# Patient Record
Sex: FEMALE | Race: WHITE | NOT HISPANIC OR LATINO | Employment: FULL TIME | URBAN - METROPOLITAN AREA
[De-identification: names, ages, dates, MRNs, and addresses within clinical notes are randomized per-mention and may not be internally consistent; named-entity substitution may affect disease eponyms.]

---

## 2018-07-12 ENCOUNTER — APPOINTMENT (OUTPATIENT)
Dept: RADIOLOGY | Facility: CLINIC | Age: 38
End: 2018-07-12

## 2018-07-12 DIAGNOSIS — Z02.89 ENCOUNTER FOR OCCUPATIONAL PHYSICAL EXAMINATION: Primary | ICD-10-CM

## 2018-07-12 PROCEDURE — 80053 COMPREHEN METABOLIC PANEL: CPT | Performed by: FAMILY MEDICINE

## 2018-07-12 PROCEDURE — 71046 X-RAY EXAM CHEST 2 VIEWS: CPT

## 2018-07-12 PROCEDURE — 85025 COMPLETE CBC W/AUTO DIFF WBC: CPT | Performed by: FAMILY MEDICINE

## 2018-10-16 ENCOUNTER — OFFICE VISIT (OUTPATIENT)
Dept: FAMILY MEDICINE CLINIC | Facility: CLINIC | Age: 38
End: 2018-10-16
Payer: COMMERCIAL

## 2018-10-16 VITALS
OXYGEN SATURATION: 98 % | DIASTOLIC BLOOD PRESSURE: 72 MMHG | WEIGHT: 169.8 LBS | RESPIRATION RATE: 16 BRPM | SYSTOLIC BLOOD PRESSURE: 100 MMHG | BODY MASS INDEX: 28.29 KG/M2 | HEART RATE: 104 BPM | TEMPERATURE: 101 F | HEIGHT: 65 IN

## 2018-10-16 DIAGNOSIS — J01.00 ACUTE NON-RECURRENT MAXILLARY SINUSITIS: Primary | ICD-10-CM

## 2018-10-16 PROCEDURE — 1036F TOBACCO NON-USER: CPT | Performed by: FAMILY MEDICINE

## 2018-10-16 PROCEDURE — 99203 OFFICE O/P NEW LOW 30 MIN: CPT | Performed by: FAMILY MEDICINE

## 2018-10-16 PROCEDURE — 3008F BODY MASS INDEX DOCD: CPT | Performed by: FAMILY MEDICINE

## 2018-10-16 RX ORDER — MULTIVITAMIN
1 TABLET ORAL DAILY
COMMUNITY
End: 2019-02-13 | Stop reason: ALTCHOICE

## 2018-10-16 RX ORDER — NORETHINDRONE 0.35 MG
KIT ORAL
COMMUNITY
Start: 2018-07-11 | End: 2019-02-13 | Stop reason: ALTCHOICE

## 2018-10-16 NOTE — PROGRESS NOTES
Subjective:           Problem List Items Addressed This Visit     None      Visit Diagnoses     Acute non-recurrent maxillary sinusitis    -  Primary              No orders of the defined types were placed in this encounter  Patient Instructions   Afrin spray 1 spray daily x 3 days  Tylenol temp >101  antibiotic as discussed      Tom Schmitt      HPI fever congestion  2-3 days  Recent marriage  Others ill at home  sinus pressure yellow green d/c cough  Some come and going R abd discomfort  BM normal no dysuria    Vitals:    10/16/18 1608   BP: 100/72   Pulse: 104   Resp: 16   Temp: (!) 101 °F (38 3 °C)   SpO2: 98%       No Known Allergies    No current outpatient prescriptions on file prior to visit  No current facility-administered medications on file prior to visit  No past medical history on file  Past Surgical History:   Procedure Laterality Date    REDUCTION MAMMAPLASTY      RHINOPLASTY            reports that she has never smoked  She has never used smokeless tobacco      reports that she has never smoked  She has never used smokeless tobacco     The following portions of the patient's history were reviewed and updated as appropriate: allergies, current medications, past family history, past medical history, past social history, past surgical history and problem list     Review of Systems   Constitutional: Positive for fever  HENT: Positive for congestion, postnasal drip, sinus pain, sinus pressure and sore throat  Negative for nosebleeds and trouble swallowing  Respiratory: Positive for cough  Gastrointestinal: Negative for abdominal distention  Genitourinary: Negative for dysuria  Musculoskeletal: Negative for back pain  Hematological: Negative for adenopathy  Psychiatric/Behavioral: Negative for agitation  Physical Exam   Constitutional: She appears well-developed and well-nourished  No distress  HENT:   Head: Normocephalic     Right Ear: External ear normal    Left Ear: External ear normal    Turbinates swelling R>L yellow mucoid   Eyes: Pupils are equal, round, and reactive to light  Conjunctivae are normal  Right eye exhibits no discharge  No scleral icterus  Neck: Normal range of motion  No JVD present  Cardiovascular: Normal rate, regular rhythm and normal heart sounds  Exam reveals no gallop  No murmur heard  Pulmonary/Chest: Effort normal and breath sounds normal  She has no wheezes  She exhibits no tenderness  Scattered rhonchi clears with cough   Abdominal: Soft  Bowel sounds are normal  She exhibits no distension and no mass  There is no tenderness  There is no rebound and no guarding  Musculoskeletal: Normal range of motion  Lymphadenopathy:     She has no cervical adenopathy  Neurological: No cranial nerve deficit  Skin: Skin is warm  Capillary refill takes less than 2 seconds  No rash noted  Psychiatric: She has a normal mood and affect

## 2018-10-22 ENCOUNTER — TELEPHONE (OUTPATIENT)
Dept: FAMILY MEDICINE CLINIC | Facility: CLINIC | Age: 38
End: 2018-10-22

## 2018-10-22 NOTE — TELEPHONE ENCOUNTER
Pt called states  Having cough and congestion    Would like to know what you recommend  She takes OTC af/rma

## 2019-02-13 ENCOUNTER — OFFICE VISIT (OUTPATIENT)
Dept: FAMILY MEDICINE CLINIC | Facility: CLINIC | Age: 39
End: 2019-02-13
Payer: COMMERCIAL

## 2019-02-13 VITALS
HEIGHT: 65 IN | DIASTOLIC BLOOD PRESSURE: 70 MMHG | OXYGEN SATURATION: 98 % | SYSTOLIC BLOOD PRESSURE: 94 MMHG | RESPIRATION RATE: 18 BRPM | TEMPERATURE: 98.8 F | BODY MASS INDEX: 31.32 KG/M2 | WEIGHT: 188 LBS | HEART RATE: 110 BPM

## 2019-02-13 DIAGNOSIS — J11.1 INFLUENZA: Primary | ICD-10-CM

## 2019-02-13 PROCEDURE — 99213 OFFICE O/P EST LOW 20 MIN: CPT | Performed by: NURSE PRACTITIONER

## 2019-02-13 PROCEDURE — 3008F BODY MASS INDEX DOCD: CPT | Performed by: NURSE PRACTITIONER

## 2019-02-13 RX ORDER — LEVOTHYROXINE SODIUM 0.03 MG/1
TABLET ORAL
COMMUNITY
Start: 2019-01-30 | End: 2020-12-22

## 2019-02-13 NOTE — PROGRESS NOTES
Assessment/Plan:    Problem List Items Addressed This Visit        Respiratory    Influenza - Primary     Recommend supportive care with fluids, rest, saline nasal lavage 1-2 sprays each nostril and Claritin daily Instructed pt RTO if symptoms persist or worsen over the course of the next week             Patient Instructions   Increase fluid intake as tolerated  - water, Gatorade, and pedialyte  Rest and humidification   Continue medications as directed   - Nasal saline spray OTC 1-2 sprays each nostril daily PRN post nasal drip   - Tylenol for fevers as directed  Return to office in one week if symptoms persist or worsen        Return in about 1 week (around 2/20/2019), or if symptoms worsen or fail to improve  Subjective:      Patient ID: Reid Cox is a 41 y o  female  Chief Complaint   Patient presents with    Vomiting     only once on monday 2/11/19    Generalized Body Aches      patietn states sx have been otis on for the past 3 days  af/rma     Cough    Cold Like Symptoms    Sore Throat    Chills       Viji Correa is a 41year old female who presents to the office for evaluation of sore throat, chills, runny nose, joint pain and body aches x's 3 days ago  Pt also reports she had one episode of vomiting on the evening of the onset of her symptoms  Denies chest pain, SOB, wheezing or diarrhea at this time  Repots intermittent fevers with Tmax 101 9 F and chills  Pt was swabbed for strep at another healthcare facility 2 days ago and results were negative for strep, culture results negative  Pt is 10 weeks pregnant  The following portions of the patient's history were reviewed and updated as appropriate: allergies, current medications, past family history, past medical history, past social history, past surgical history and problem list     Review of Systems   Constitutional: Positive for chills, fatigue and fever  Negative for diaphoresis     HENT: Positive for congestion, rhinorrhea and sore throat  Negative for ear discharge, ear pain, postnasal drip, sinus pressure and sinus pain  Eyes: Negative for pain and discharge  Respiratory: Positive for cough  Negative for chest tightness, shortness of breath and wheezing  Cardiovascular: Negative for chest pain  Gastrointestinal: Positive for vomiting  Negative for diarrhea and nausea  Genitourinary: Negative for dysuria  Musculoskeletal: Positive for arthralgias and myalgias  Skin: Negative for rash  Neurological: Negative for dizziness and headaches  Hematological: Negative for adenopathy  Current Outpatient Medications   Medication Sig Dispense Refill    BIOTIN PO Take by mouth      Docosahexaenoic Acid (PRENATAL DHA PO) Take 1 tablet by mouth daily      levothyroxine 25 mcg tablet        No current facility-administered medications for this visit  Objective:    BP 94/70 (BP Location: Right arm, Patient Position: Sitting, Cuff Size: Large)   Pulse (!) 110   Temp 98 8 °F (37 1 °C) (Tympanic)   Resp 18   Ht 5' 5" (1 651 m)   Wt 85 3 kg (188 lb)   LMP 11/13/2018 (Approximate)   SpO2 98%   BMI 31 28 kg/m²        Physical Exam   Constitutional: She is oriented to person, place, and time  She appears well-developed and well-nourished  She appears ill  No distress  HENT:   Head: Normocephalic and atraumatic  Right Ear: External ear and ear canal normal  No drainage, swelling or tenderness  No middle ear effusion  Left Ear: External ear and ear canal normal  No drainage, swelling or tenderness  No middle ear effusion  Nose: Mucosal edema and rhinorrhea present  No sinus tenderness  Right sinus exhibits no maxillary sinus tenderness and no frontal sinus tenderness  Left sinus exhibits no maxillary sinus tenderness and no frontal sinus tenderness  Mouth/Throat: Uvula is midline and mucous membranes are normal  Posterior oropharyngeal erythema present  No oropharyngeal exudate or posterior oropharyngeal edema  No tonsillar exudate  Eyes: Conjunctivae are normal  Right eye exhibits no discharge  Left eye exhibits no discharge  Neck: Normal range of motion  Neck supple  No thyromegaly present  Cardiovascular: Normal rate, regular rhythm and normal heart sounds  Pulmonary/Chest: Effort normal and breath sounds normal  No respiratory distress  She has no decreased breath sounds  She has no wheezes  She has no rhonchi  She has no rales  Abdominal: Soft  Bowel sounds are normal  She exhibits no distension  There is no tenderness  Lymphadenopathy:     She has no cervical adenopathy  Neurological: She is alert and oriented to person, place, and time  Skin: Skin is warm and dry  No rash noted  She is not diaphoretic  Psychiatric: She has a normal mood and affect   Her behavior is normal  Thought content normal          GERI Guaman

## 2019-02-13 NOTE — PATIENT INSTRUCTIONS
Increase fluid intake as tolerated  - water, Gatorade, and pedialyte  Rest and humidification   Continue medications as directed   - Nasal saline spray OTC 1-2 sprays each nostril daily PRN post nasal drip   - Tylenol for fevers as directed  Return to office in one week if symptoms persist or worsen

## 2019-02-13 NOTE — ASSESSMENT & PLAN NOTE
Recommend supportive care with fluids, rest, saline nasal lavage 1-2 sprays each nostril and Claritin daily Instructed pt RTO if symptoms persist or worsen over the course of the next week

## 2019-02-15 ENCOUNTER — TELEPHONE (OUTPATIENT)
Dept: FAMILY MEDICINE CLINIC | Facility: CLINIC | Age: 39
End: 2019-02-15

## 2019-02-15 NOTE — TELEPHONE ENCOUNTER
patient called states  Is still coughing and  Is unable to sleep because of the cough would like to know if she could take any other medication besides the robitussin  Please advise?  af/jessica

## 2019-02-15 NOTE — TELEPHONE ENCOUNTER
spoke to pt aware of previous mentioned  States she is having on and off again fever when she takes tylenol and the fever comes down and goes back up  af/rma

## 2019-02-15 NOTE — TELEPHONE ENCOUNTER
She should continue to take tylenol and come into the office or go to ER for any fever lasting longer than 2 hours without relief from tylenol   Bela Arredondo

## 2019-02-15 NOTE — TELEPHONE ENCOUNTER
Pt may take mucinex (PLAIN mucinex OTC) which is safe for pregnancy   Trg [address] Mend[address] Garrison

## 2019-02-25 ENCOUNTER — TELEPHONE (OUTPATIENT)
Dept: FAMILY MEDICINE CLINIC | Facility: CLINIC | Age: 39
End: 2019-02-25

## 2019-02-25 NOTE — TELEPHONE ENCOUNTER
patient called states has cough and headaches  Has been taking tylenol but is not helping can she take anything else since she is pregnant? af/rma

## 2019-02-25 NOTE — TELEPHONE ENCOUNTER
Patient called back informed of previous mentioned, states the headaches are not going away informed patient as per Dr Jazmín Trevino patient can try Asprin free of Excedrin, minimal Advil and to call obgyn to confirm safe dosage  Of medication gemma erazo as she is 15 weeks pregnant   af/rma

## 2019-03-18 ENCOUNTER — OFFICE VISIT (OUTPATIENT)
Dept: FAMILY MEDICINE CLINIC | Facility: CLINIC | Age: 39
End: 2019-03-18
Payer: COMMERCIAL

## 2019-03-18 VITALS
TEMPERATURE: 98.9 F | HEART RATE: 104 BPM | HEIGHT: 65 IN | WEIGHT: 189.8 LBS | RESPIRATION RATE: 18 BRPM | BODY MASS INDEX: 31.62 KG/M2 | SYSTOLIC BLOOD PRESSURE: 112 MMHG | DIASTOLIC BLOOD PRESSURE: 70 MMHG

## 2019-03-18 DIAGNOSIS — E03.9 ACQUIRED HYPOTHYROIDISM: ICD-10-CM

## 2019-03-18 DIAGNOSIS — R05.9 COUGH: Primary | ICD-10-CM

## 2019-03-18 PROCEDURE — 99213 OFFICE O/P EST LOW 20 MIN: CPT | Performed by: FAMILY MEDICINE

## 2019-03-18 PROCEDURE — 1036F TOBACCO NON-USER: CPT | Performed by: FAMILY MEDICINE

## 2019-03-18 PROCEDURE — 3008F BODY MASS INDEX DOCD: CPT | Performed by: FAMILY MEDICINE

## 2019-03-18 RX ORDER — CHOLECALCIFEROL (VITAMIN D3) 125 MCG
TABLET ORAL
Status: ON HOLD | COMMUNITY
End: 2021-01-06

## 2019-03-18 RX ORDER — PROMETHAZINE HYDROCHLORIDE AND CODEINE PHOSPHATE 6.25; 1 MG/5ML; MG/5ML
5 SYRUP ORAL 3 TIMES DAILY PRN
Qty: 120 ML | Refills: 0 | Status: ON HOLD | OUTPATIENT
Start: 2019-03-18 | End: 2021-01-06

## 2019-03-18 NOTE — PROGRESS NOTES
Subjective:           Problem List Items Addressed This Visit        Endocrine    Acquired hypothyroidism labs review ? endocrinology Free t4       Other    Cough - Primary use sp[aringly f/u OB  Relevant Medications    promethazine-codeine (PHENERGAN WITH CODEINE) 6 25-10 mg/5 mL syrup              No orders of the defined types were placed in this encounter  Patient Instructions   Hydrate well  Claritin 10mg 1 daily   F/u OB   Robitussin DM 10cc fabio 8 hours       f/u if fever yellow sputum or nasal discharge fever  [place]    Chief Complaint   Patient presents with    Cough     Pt is 4mo pregnant  jmcma    Vomitting    Headache    Sinus Congestion    Sore Throat     HPI Jagjit Kahn is in for evaluation ongoing cough prompting vomiting  She has no abdominal pain or fever no urinary symptoms cough is dry persistent  She has a history of hypothyroidism needs labs    /70   Pulse 104   Temp 98 9 °F (37 2 °C)   Resp 18   Ht 5' 5" (1 651 m)   Wt 86 1 kg (189 lb 12 8 oz)   LMP 11/13/2018 (Approximate)   BMI 31 58 kg/m²       No Known Allergies    Current Outpatient Medications on File Prior to Visit   Medication Sig Dispense Refill    Docosahexaenoic Acid (PRENATAL DHA PO) Take 1 tablet by mouth daily      Ergocalciferol (VITAMIN D2) 2000 units TABS Take by mouth      levothyroxine 25 mcg tablet       BIOTIN PO Take by mouth       No current facility-administered medications on file prior to visit  History reviewed  No pertinent past medical history  Past Surgical History:   Procedure Laterality Date    REDUCTION MAMMAPLASTY      RHINOPLASTY            reports that she has never smoked  She has never used smokeless tobacco      reports that she has never smoked  She has never used smokeless tobacco         Review of Systems   Constitutional: Negative for fever  HENT: Positive for congestion, postnasal drip, sinus pressure and sinus pain   Negative for nosebleeds, sore throat and trouble swallowing  Respiratory: Positive for cough  Gastrointestinal: Positive for vomiting  Negative for abdominal distention  Cough induced   Genitourinary: Negative for dysuria, hematuria, pelvic pain, urgency, vaginal bleeding and vaginal discharge  Pregnant 2nd trim   Musculoskeletal: Negative for back pain and neck stiffness  Skin: Negative for color change  Hematological: Negative for adenopathy  Psychiatric/Behavioral: Negative for agitation  Physical Exam   Constitutional: She is oriented to person, place, and time  She appears well-developed and well-nourished  No distress  HENT:   Head: Normocephalic  Right Ear: External ear normal    Left Ear: External ear normal    Turbinate mucoid   Eyes: Pupils are equal, round, and reactive to light  Conjunctivae are normal  Right eye exhibits no discharge  No scleral icterus  Neck: Normal range of motion  No JVD present  Cardiovascular: Normal rate, regular rhythm and normal heart sounds  Exam reveals no gallop  No murmur heard  Pulmonary/Chest: Effort normal and breath sounds normal  She has no wheezes  She exhibits no tenderness  Scattered rhonchi clears with cough   Abdominal: Soft  Bowel sounds are normal  She exhibits no distension and no mass  There is no tenderness  There is no rebound and no guarding  Musculoskeletal: Normal range of motion  Lymphadenopathy:     She has no cervical adenopathy  Neurological: She is alert and oriented to person, place, and time  No cranial nerve deficit  Skin: Skin is warm  Capillary refill takes less than 2 seconds  No rash noted  To umb   Psychiatric: She has a normal mood and affect

## 2019-03-18 NOTE — PATIENT INSTRUCTIONS
Hydrate well  Claritin 10mg 1 daily   F/u OB   Robitussin DM 10cc fabio 8 hours       f/u if fever yellow sputum or nasal discharge fever

## 2019-09-23 ENCOUNTER — TELEPHONE (OUTPATIENT)
Dept: FAMILY MEDICINE CLINIC | Facility: CLINIC | Age: 39
End: 2019-09-23

## 2019-09-23 NOTE — TELEPHONE ENCOUNTER
Patient calling today, she is 4 weeks postpartum  She states that she has not gone to the bathroom properly in a week  Stool is very hard and patient has to strain a lot with little to know relief and if having some bright red blood when she wipes  Has been taking an otc oral stool softener  When asked if she has contacted her Obgyn she states that she has used a midwife and she thinks this is beyond their scope of practice because she has looked to them for assistance, but they have not been much help  Patient advised per verbal from Dr Jose Campos to use warm prune juice 4, drink 6-8 glasses of water per day, glycerin suppositories, and miralax 1/2 cap full daily x 5 days  If no bowel movement in 24-48 hours patient should go to ER and consult with and Obgyn  Patient understands  No further action needed at this time

## 2020-02-05 ENCOUNTER — TELEPHONE (OUTPATIENT)
Dept: FAMILY MEDICINE CLINIC | Facility: CLINIC | Age: 40
End: 2020-02-05

## 2020-02-05 NOTE — TELEPHONE ENCOUNTER
Patient is breast feeding her 11 month old baby  She has a non productive cough  No other sxs, no fever, no congestion  Would like to know what she can take? Please advise    Judy Arredondo MA

## 2020-02-05 NOTE — TELEPHONE ENCOUNTER
claritin ok for nasal congestion   Mucinex or Robitussin DM ok for cough    decaf tea ok  ,cough drops

## 2020-06-23 ENCOUNTER — APPOINTMENT (OUTPATIENT)
Dept: RADIOLOGY | Facility: CLINIC | Age: 40
End: 2020-06-23
Payer: COMMERCIAL

## 2020-06-23 ENCOUNTER — OFFICE VISIT (OUTPATIENT)
Dept: OBGYN CLINIC | Facility: CLINIC | Age: 40
End: 2020-06-23
Payer: COMMERCIAL

## 2020-06-23 VITALS
SYSTOLIC BLOOD PRESSURE: 95 MMHG | HEIGHT: 65 IN | BODY MASS INDEX: 35.82 KG/M2 | TEMPERATURE: 97.7 F | WEIGHT: 215 LBS | HEART RATE: 75 BPM | DIASTOLIC BLOOD PRESSURE: 68 MMHG

## 2020-06-23 DIAGNOSIS — M25.531 PAIN IN RIGHT WRIST: ICD-10-CM

## 2020-06-23 DIAGNOSIS — M65.4 DE QUERVAIN'S TENOSYNOVITIS, RIGHT: Primary | ICD-10-CM

## 2020-06-23 PROCEDURE — 3008F BODY MASS INDEX DOCD: CPT | Performed by: ORTHOPAEDIC SURGERY

## 2020-06-23 PROCEDURE — 99203 OFFICE O/P NEW LOW 30 MIN: CPT | Performed by: ORTHOPAEDIC SURGERY

## 2020-06-23 PROCEDURE — 73110 X-RAY EXAM OF WRIST: CPT

## 2020-06-23 PROCEDURE — 1036F TOBACCO NON-USER: CPT | Performed by: ORTHOPAEDIC SURGERY

## 2020-06-23 RX ORDER — MULTIVIT WITH MINERALS/LUTEIN
1000 TABLET ORAL DAILY
Status: ON HOLD | COMMUNITY
End: 2021-01-06

## 2020-06-23 RX ORDER — PHENOL 1.4 %
600 AEROSOL, SPRAY (ML) MUCOUS MEMBRANE 2 TIMES DAILY WITH MEALS
Status: ON HOLD | COMMUNITY
End: 2021-01-06

## 2020-08-31 ENCOUNTER — OFFICE VISIT (OUTPATIENT)
Dept: OBGYN CLINIC | Facility: CLINIC | Age: 40
End: 2020-08-31
Payer: COMMERCIAL

## 2020-08-31 VITALS
SYSTOLIC BLOOD PRESSURE: 102 MMHG | HEIGHT: 65 IN | DIASTOLIC BLOOD PRESSURE: 72 MMHG | BODY MASS INDEX: 35.85 KG/M2 | TEMPERATURE: 98.3 F | HEART RATE: 86 BPM | WEIGHT: 215.2 LBS

## 2020-08-31 DIAGNOSIS — M65.4 DE QUERVAIN'S TENOSYNOVITIS, RIGHT: Primary | ICD-10-CM

## 2020-08-31 PROCEDURE — 1036F TOBACCO NON-USER: CPT | Performed by: ORTHOPAEDIC SURGERY

## 2020-08-31 PROCEDURE — 3008F BODY MASS INDEX DOCD: CPT | Performed by: ORTHOPAEDIC SURGERY

## 2020-08-31 PROCEDURE — 99213 OFFICE O/P EST LOW 20 MIN: CPT | Performed by: ORTHOPAEDIC SURGERY

## 2020-08-31 NOTE — PROGRESS NOTES
Assessment/Plan:  1  De Quervain's tenosynovitis, right         Scribe Attestation    I,:   [employer], MA am acting as a scribe while in the presence of the attending physician :        I,:   Rachel Fletcher, DO personally performed the services described in this documentation    as scribed in my presence :              BODØ has continued pain  Patient is still currently breat feeding  I explained to her I do not do steroid injections in patients who are breast feeding  We did again discuss formal therapy however, she states she does not have time to attend therapy  She may take Tylenol OTC as needed for pain  We did briefly discuss surgical intervention in the form of de quervain's release  She may follow up with me as needed  Subjective:   Kadeem Good is a 47 y o  female who presents to the office today for follow up evaluation right wrist de quervain's  Patient states she is doing well  She states her pain has improved somewhat  She has been using the wrist brace as needed  She states the wrist brace at times can increase her pain  Patient states she is still currently breast feeding  Review of Systems   Constitutional: Negative for chills and fever  HENT: Negative for drooling and sneezing  Eyes: Negative for redness  Respiratory: Negative for cough and wheezing  Gastrointestinal: Negative for nausea and vomiting  Musculoskeletal: Negative for arthralgias, joint swelling and myalgias  Neurological: Negative for weakness and numbness  Psychiatric/Behavioral: Negative for behavioral problems  The patient is not nervous/anxious  History reviewed  No pertinent past medical history      Past Surgical History:   Procedure Laterality Date    REDUCTION MAMMAPLASTY      RHINOPLASTY         Family History   Problem Relation Age of Onset    Stroke Maternal Grandmother        Social History     Occupational History    Not on file   Tobacco Use    Smoking status: Never Smoker  Smokeless tobacco: Never Used   Substance and Sexual Activity    Alcohol use: Yes    Drug use: Never    Sexual activity: Not on file         Current Outpatient Medications:     Ascorbic Acid (VITAMIN C) 1000 MG tablet, Take 1,000 mg by mouth daily, Disp: , Rfl:     BIOTIN PO, Take by mouth, Disp: , Rfl:     calcium carbonate (OS-SEAN) 600 MG tablet, Take 600 mg by mouth 2 (two) times a day with meals, Disp: , Rfl:     Docosahexaenoic Acid (PRENATAL DHA PO), Take 1 tablet by mouth daily, Disp: , Rfl:     Ergocalciferol (VITAMIN D2) 2000 units TABS, Take by mouth, Disp: , Rfl:     levothyroxine 25 mcg tablet, , Disp: , Rfl:     promethazine-codeine (PHENERGAN WITH CODEINE) 6 25-10 mg/5 mL syrup, Take 5 mL by mouth 3 (three) times a day as needed for cough (nausea) (Patient not taking: Reported on 6/23/2020), Disp: 120 mL, Rfl: 0    No Known Allergies    Objective:  Vitals:    08/31/20 1720   BP: 102/72   Pulse: 86   Temp: 98 3 °F (36 8 °C)       Ortho Exam     Right wrist    TTP 1st dorsal compartment   Mild crepitus over 1st dorsal compartment   Compartments soft  Brisk capillary refill  S/m intact median, radial, and ulnar nerve     Physical Exam  Constitutional:       Appearance: She is well-developed  HENT:      Head: Normocephalic and atraumatic  Eyes:      General:         Right eye: No discharge  Left eye: No discharge  Conjunctiva/sclera: Conjunctivae normal    Neck:      Musculoskeletal: Normal range of motion and neck supple  Cardiovascular:      Rate and Rhythm: Normal rate  Pulmonary:      Effort: Pulmonary effort is normal  No respiratory distress  Musculoskeletal:      Comments: As noted in HPI   Skin:     General: Skin is warm and dry  Neurological:      Mental Status: She is alert and oriented to person, place, and time  Psychiatric:         Behavior: Behavior normal          Thought Content:  Thought content normal          Judgment: Judgment normal

## 2020-09-25 ENCOUNTER — OFFICE VISIT (OUTPATIENT)
Dept: OBGYN CLINIC | Facility: CLINIC | Age: 40
End: 2020-09-25
Payer: COMMERCIAL

## 2020-09-25 VITALS
HEIGHT: 65 IN | SYSTOLIC BLOOD PRESSURE: 105 MMHG | TEMPERATURE: 99.2 F | HEART RATE: 88 BPM | DIASTOLIC BLOOD PRESSURE: 74 MMHG | WEIGHT: 215 LBS | BODY MASS INDEX: 35.82 KG/M2

## 2020-09-25 DIAGNOSIS — M65.4 DE QUERVAIN'S TENOSYNOVITIS, RIGHT: Primary | ICD-10-CM

## 2020-09-25 PROCEDURE — 99213 OFFICE O/P EST LOW 20 MIN: CPT | Performed by: ORTHOPAEDIC SURGERY

## 2020-09-25 PROCEDURE — 1036F TOBACCO NON-USER: CPT | Performed by: ORTHOPAEDIC SURGERY

## 2020-09-25 PROCEDURE — 20550 NJX 1 TENDON SHEATH/LIGAMENT: CPT | Performed by: ORTHOPAEDIC SURGERY

## 2020-09-25 NOTE — PROGRESS NOTES
Assessment/Plan:  1  De Quervain's tenosynovitis, right       Steroid injection was provided today for right de Quervain  Patient tolerated this well  She will follow up in 2-3 months for re-evaluation  She may be having another child soon in which case she would be breast-feeding again  She is considering having surgery  We reviewed surgical versus nonsurgical options  Postop instructions were also provided  All questions were answered  Subjective: R wrist pain    Patient ID: Se Gonzalez is a 47 y o  female  HPI  Right wrist pain follow-up     Patient presents today for follow-up for right wrist   She is no longer breast-feeding  She is having pain over the same area she presented prior  She declined an injection at that time as she was breast-feeding  She has no numbness or tingling about the hand  She does have pain over the radial aspect of the wrist and is worse with opening a jar lifting her small child  Review of Systems   Constitutional: Positive for activity change  Negative for chills, fever and unexpected weight change  HENT: Negative for hearing loss, nosebleeds and sore throat  Eyes: Negative for pain, redness and visual disturbance  Respiratory: Negative for cough, shortness of breath and wheezing  Cardiovascular: Negative for chest pain, palpitations and leg swelling  Gastrointestinal: Negative for abdominal pain, nausea and vomiting  Endocrine: Negative for polydipsia and polyuria  Genitourinary: Negative for dysuria and hematuria  Musculoskeletal:        See HPI   Skin: Negative for rash and wound  Neurological: Negative for dizziness, numbness and headaches  Psychiatric/Behavioral: Negative for decreased concentration and suicidal ideas  The patient is not nervous/anxious  History reviewed  No pertinent past medical history      Past Surgical History:   Procedure Laterality Date    REDUCTION MAMMAPLASTY      RHINOPLASTY         Family History   Problem Relation Age of Onset    Stroke Maternal Grandmother        Social History     Occupational History    Not on file   Tobacco Use    Smoking status: Never Smoker    Smokeless tobacco: Never Used   Substance and Sexual Activity    Alcohol use: Yes    Drug use: Never    Sexual activity: Not on file         Current Outpatient Medications:     Ascorbic Acid (VITAMIN C) 1000 MG tablet, Take 1,000 mg by mouth daily, Disp: , Rfl:     BIOTIN PO, Take by mouth, Disp: , Rfl:     calcium carbonate (OS-SEAN) 600 MG tablet, Take 600 mg by mouth 2 (two) times a day with meals, Disp: , Rfl:     Docosahexaenoic Acid (PRENATAL DHA PO), Take 1 tablet by mouth daily, Disp: , Rfl:     Ergocalciferol (VITAMIN D2) 2000 units TABS, Take by mouth, Disp: , Rfl:     levothyroxine 25 mcg tablet, , Disp: , Rfl:     promethazine-codeine (PHENERGAN WITH CODEINE) 6 25-10 mg/5 mL syrup, Take 5 mL by mouth 3 (three) times a day as needed for cough (nausea) (Patient not taking: Reported on 6/23/2020), Disp: 120 mL, Rfl: 0    No Known Allergies    Objective:  Vitals:    09/25/20 0836   BP: 105/74   Pulse: 88   Temp: 99 2 °F (37 3 °C)       Body mass index is 35 78 kg/m²  Ortho Exam       Right wrist  Positive tender over the 1st dorsal compartment  Positive hitchhiker sign  +/- Finkelstein sign  Full range of motion  Compartment soft  Negative Tinel's  Negative Durkan's    Physical Exam  Vitals signs and nursing note reviewed  Constitutional:       Appearance: She is well-developed  HENT:      Head: Normocephalic and atraumatic  Eyes:      Conjunctiva/sclera: Conjunctivae normal       Pupils: Pupils are equal, round, and reactive to light  Neck:      Musculoskeletal: Normal range of motion and neck supple  Cardiovascular:      Rate and Rhythm: Normal rate  Pulmonary:      Effort: Pulmonary effort is normal  No respiratory distress     Musculoskeletal:      Comments: As noted in HPI   Skin:     General: Skin is warm and dry  Neurological:      Mental Status: She is alert and oriented to person, place, and time  Psychiatric:         Behavior: Behavior normal          I have personally reviewed pertinent films in PACS          Hand/upper extremity injection  Date/Time: 9/25/2020 9:49 AM  Consent given by: patient  Supporting Documentation  Indications: tendon swelling   Procedure Details  Condition:de Quervain's tenosynovitis Needle size: 27 G  Ultrasound guidance: no  Approach: radial    Patient tolerance: patient tolerated the procedure well with no immediate complications  Dressing:  Sterile dressing applied

## 2020-12-07 ENCOUNTER — OFFICE VISIT (OUTPATIENT)
Dept: OBGYN CLINIC | Facility: CLINIC | Age: 40
End: 2020-12-07
Payer: COMMERCIAL

## 2020-12-07 VITALS
HEART RATE: 83 BPM | HEIGHT: 65 IN | DIASTOLIC BLOOD PRESSURE: 77 MMHG | SYSTOLIC BLOOD PRESSURE: 115 MMHG | WEIGHT: 215 LBS | BODY MASS INDEX: 35.82 KG/M2

## 2020-12-07 DIAGNOSIS — M65.4 DE QUERVAIN'S TENOSYNOVITIS, RIGHT: Primary | ICD-10-CM

## 2020-12-07 PROCEDURE — 99213 OFFICE O/P EST LOW 20 MIN: CPT | Performed by: ORTHOPAEDIC SURGERY

## 2020-12-07 PROCEDURE — 3008F BODY MASS INDEX DOCD: CPT | Performed by: ORTHOPAEDIC SURGERY

## 2020-12-07 PROCEDURE — 1036F TOBACCO NON-USER: CPT | Performed by: ORTHOPAEDIC SURGERY

## 2020-12-07 RX ORDER — CEFAZOLIN SODIUM 2 G/50ML
2000 SOLUTION INTRAVENOUS ONCE
Status: CANCELLED | OUTPATIENT
Start: 2021-01-06 | End: 2020-12-07

## 2020-12-22 NOTE — PRE-PROCEDURE INSTRUCTIONS
My Surgical Experience    The following information was developed to assist you to prepare for your operation  What do I need to do before coming to the hospital?   Arrange for a responsible person to drive you to and from the hospital    Arrange care for your children at home  Children are not allowed in the recovery areas of the hospital   Plan to wear clothing that is easy to put on and take off  If you are having shoulder surgery, wear a shirt that buttons or zippers in the front  Bathing  o Shower the evening before and the morning of your surgery with an antibacterial soap  Please refer to the Pre Op Showering Instructions for Surgery Patients Sheet   o Remove nail polish and all body piercing jewelry  o Do not shave any body part for at least 24 hours before surgery-this includes face, arms, legs and upper body  Food  o Nothing to eat or drink after midnight the night before your surgery  This includes candy and chewing gum  o Exception: If your surgery is after 12:00pm (noon), you may have clear liquids such as 7-Up®, ginger ale, apple or cranberry juice, Jell-O®, water, or clear broth until 8:00 am  o Do not drink milk or juice with pulp on the morning before surgery  o Do not drink alcohol 24 hours before surgery  Medicine  o Follow instructions you received from your surgeon about which medicines you may take on the day of surgery  o If instructed to take medicine on the morning of surgery, take pills with just a small sip of water  Call your prescribing doctor for specific infroamtion on what to do if you take insulin    What should I bring to the hospital?    Bring:  Navya Kerr or a walker, if you have them, for foot or knee surgery   A list of the daily medicines, vitamins, minerals, herbals and nutritional supplements you take   Include the dosages of medicines and the time you take them each day   Glasses, dentures or hearing aids   Minimal clothing; you will be wearing hospital sleepwear   Photo ID; required to verify your identity   If you have a Living Will or Power of , bring a copy of the documents   If you have an ostomy, bring an extra pouch and any supplies you use    Do not bring   Medicines or inhalers   Money, valuables or jewelry    What other information should I know about the day of surgery?  Notify your surgeons if you develop a cold, sore throat, cough, fever, rash or any other illness   Report to the Ambulatory Surgical/Same Day Surgery Unit   You will be instructed to stop at Registration only if you have not been pre-registered   Inform your  fi they do not stay that they will be asked by the staff to leave a phone number where they can be reached   Be available to be reached before surgery  In the event the operating room schedule changes, you may be asked to come in earlier or later than expected    *It is important to tell your doctor and others involved in your health care if you are taking or have been taking any non-prescription drugs, vitamins, minerals, herbals or other nutritional supplements  Any of these may interact with some food or medicines and cause a reaction      Pre-Surgery Instructions:   Medication Instructions    Docosahexaenoic Acid (PRENATAL DHA PO) Instructed patient per Anesthesia Guidelines   Ergocalciferol (VITAMIN D2) 2000 units TABS Instructed patient per Anesthesia Guidelines

## 2020-12-31 DIAGNOSIS — M65.4 DE QUERVAIN'S TENOSYNOVITIS, RIGHT: ICD-10-CM

## 2020-12-31 PROCEDURE — U0003 INFECTIOUS AGENT DETECTION BY NUCLEIC ACID (DNA OR RNA); SEVERE ACUTE RESPIRATORY SYNDROME CORONAVIRUS 2 (SARS-COV-2) (CORONAVIRUS DISEASE [COVID-19]), AMPLIFIED PROBE TECHNIQUE, MAKING USE OF HIGH THROUGHPUT TECHNOLOGIES AS DESCRIBED BY CMS-2020-01-R: HCPCS | Performed by: ORTHOPAEDIC SURGERY

## 2021-01-01 LAB — SARS-COV-2 RNA SPEC QL NAA+PROBE: NOT DETECTED

## 2021-01-04 ENCOUNTER — ANESTHESIA EVENT (OUTPATIENT)
Dept: PERIOP | Facility: HOSPITAL | Age: 41
End: 2021-01-04
Payer: COMMERCIAL

## 2021-01-06 ENCOUNTER — HOSPITAL ENCOUNTER (OUTPATIENT)
Facility: HOSPITAL | Age: 41
Setting detail: OUTPATIENT SURGERY
Discharge: HOME | End: 2021-01-06
Attending: ORTHOPAEDIC SURGERY | Admitting: ORTHOPAEDIC SURGERY
Payer: COMMERCIAL

## 2021-01-06 ENCOUNTER — ANESTHESIA (OUTPATIENT)
Dept: PERIOP | Facility: HOSPITAL | Age: 41
End: 2021-01-06
Payer: COMMERCIAL

## 2021-01-06 VITALS
WEIGHT: 208.4 LBS | HEART RATE: 68 BPM | DIASTOLIC BLOOD PRESSURE: 62 MMHG | OXYGEN SATURATION: 98 % | TEMPERATURE: 97.2 F | BODY MASS INDEX: 34.72 KG/M2 | RESPIRATION RATE: 16 BRPM | SYSTOLIC BLOOD PRESSURE: 105 MMHG | HEIGHT: 65 IN

## 2021-01-06 VITALS — HEART RATE: 63 BPM

## 2021-01-06 DIAGNOSIS — M65.4 DE QUERVAIN'S TENOSYNOVITIS, RIGHT: Primary | ICD-10-CM

## 2021-01-06 LAB
EXT PREGNANCY TEST URINE: NEGATIVE
EXT. CONTROL: NORMAL

## 2021-01-06 PROCEDURE — 81025 URINE PREGNANCY TEST: CPT | Performed by: ANESTHESIOLOGY

## 2021-01-06 PROCEDURE — 25000 INCISION OF TENDON SHEATH: CPT | Performed by: ORTHOPAEDIC SURGERY

## 2021-01-06 PROCEDURE — 99024 POSTOP FOLLOW-UP VISIT: CPT | Performed by: ORTHOPAEDIC SURGERY

## 2021-01-06 PROCEDURE — 25000 INCISION OF TENDON SHEATH: CPT | Performed by: PHYSICIAN ASSISTANT

## 2021-01-06 PROCEDURE — NC001 PR NO CHARGE: Performed by: PHYSICIAN ASSISTANT

## 2021-01-06 RX ORDER — MAGNESIUM HYDROXIDE 1200 MG/15ML
LIQUID ORAL AS NEEDED
Status: DISCONTINUED | OUTPATIENT
Start: 2021-01-06 | End: 2021-01-06 | Stop reason: HOSPADM

## 2021-01-06 RX ORDER — MEPERIDINE HYDROCHLORIDE 25 MG/ML
12.5 INJECTION INTRAMUSCULAR; INTRAVENOUS; SUBCUTANEOUS
Status: DISCONTINUED | OUTPATIENT
Start: 2021-01-06 | End: 2021-01-06 | Stop reason: HOSPADM

## 2021-01-06 RX ORDER — SODIUM CHLORIDE, SODIUM LACTATE, POTASSIUM CHLORIDE, CALCIUM CHLORIDE 600; 310; 30; 20 MG/100ML; MG/100ML; MG/100ML; MG/100ML
125 INJECTION, SOLUTION INTRAVENOUS CONTINUOUS
Status: DISCONTINUED | OUTPATIENT
Start: 2021-01-06 | End: 2021-01-06 | Stop reason: HOSPADM

## 2021-01-06 RX ORDER — ONDANSETRON 2 MG/ML
4 INJECTION INTRAMUSCULAR; INTRAVENOUS ONCE AS NEEDED
Status: CANCELLED | OUTPATIENT
Start: 2021-01-06

## 2021-01-06 RX ORDER — FENTANYL CITRATE 50 UG/ML
INJECTION, SOLUTION INTRAMUSCULAR; INTRAVENOUS AS NEEDED
Status: DISCONTINUED | OUTPATIENT
Start: 2021-01-06 | End: 2021-01-06

## 2021-01-06 RX ORDER — HYDROCODONE BITARTRATE AND ACETAMINOPHEN 5; 325 MG/1; MG/1
1 TABLET ORAL EVERY 6 HOURS PRN
Qty: 12 TABLET | Refills: 0 | Status: SHIPPED | OUTPATIENT
Start: 2021-01-06 | End: 2021-01-16

## 2021-01-06 RX ORDER — FENTANYL CITRATE/PF 50 MCG/ML
25 SYRINGE (ML) INJECTION
Status: DISCONTINUED | OUTPATIENT
Start: 2021-01-06 | End: 2021-01-06 | Stop reason: HOSPADM

## 2021-01-06 RX ORDER — ONDANSETRON 2 MG/ML
INJECTION INTRAMUSCULAR; INTRAVENOUS AS NEEDED
Status: DISCONTINUED | OUTPATIENT
Start: 2021-01-06 | End: 2021-01-06

## 2021-01-06 RX ORDER — PROPOFOL 10 MG/ML
INJECTION, EMULSION INTRAVENOUS AS NEEDED
Status: DISCONTINUED | OUTPATIENT
Start: 2021-01-06 | End: 2021-01-06

## 2021-01-06 RX ORDER — CEFAZOLIN SODIUM 2 G/50ML
2000 SOLUTION INTRAVENOUS ONCE
Status: COMPLETED | OUTPATIENT
Start: 2021-01-06 | End: 2021-01-06

## 2021-01-06 RX ORDER — DEXAMETHASONE SODIUM PHOSPHATE 4 MG/ML
INJECTION, SOLUTION INTRA-ARTICULAR; INTRALESIONAL; INTRAMUSCULAR; INTRAVENOUS; SOFT TISSUE AS NEEDED
Status: DISCONTINUED | OUTPATIENT
Start: 2021-01-06 | End: 2021-01-06

## 2021-01-06 RX ORDER — MIDAZOLAM HYDROCHLORIDE 2 MG/2ML
INJECTION, SOLUTION INTRAMUSCULAR; INTRAVENOUS AS NEEDED
Status: DISCONTINUED | OUTPATIENT
Start: 2021-01-06 | End: 2021-01-06

## 2021-01-06 RX ORDER — PROPOFOL 10 MG/ML
INJECTION, EMULSION INTRAVENOUS CONTINUOUS PRN
Status: DISCONTINUED | OUTPATIENT
Start: 2021-01-06 | End: 2021-01-06

## 2021-01-06 RX ORDER — ONDANSETRON 2 MG/ML
4 INJECTION INTRAMUSCULAR; INTRAVENOUS ONCE AS NEEDED
Status: DISCONTINUED | OUTPATIENT
Start: 2021-01-06 | End: 2021-01-06 | Stop reason: HOSPADM

## 2021-01-06 RX ADMIN — DEXAMETHASONE SODIUM PHOSPHATE 4 MG: 4 INJECTION, SOLUTION INTRA-ARTICULAR; INTRALESIONAL; INTRAMUSCULAR; INTRAVENOUS; SOFT TISSUE at 08:51

## 2021-01-06 RX ADMIN — FENTANYL CITRATE 50 MCG: 50 INJECTION, SOLUTION INTRAMUSCULAR; INTRAVENOUS at 08:43

## 2021-01-06 RX ADMIN — MIDAZOLAM 2 MG: 1 INJECTION INTRAMUSCULAR; INTRAVENOUS at 08:41

## 2021-01-06 RX ADMIN — PHENYLEPHRINE HYDROCHLORIDE 2 DROP: 1 SPRAY NASAL at 08:48

## 2021-01-06 RX ADMIN — CEFAZOLIN SODIUM 2000 MG: 2 SOLUTION INTRAVENOUS at 08:48

## 2021-01-06 RX ADMIN — FENTANYL CITRATE 50 MCG: 50 INJECTION, SOLUTION INTRAMUSCULAR; INTRAVENOUS at 08:47

## 2021-01-06 RX ADMIN — SODIUM CHLORIDE, SODIUM LACTATE, POTASSIUM CHLORIDE, AND CALCIUM CHLORIDE: .6; .31; .03; .02 INJECTION, SOLUTION INTRAVENOUS at 08:41

## 2021-01-06 RX ADMIN — PROPOFOL 80 MG: 10 INJECTION, EMULSION INTRAVENOUS at 08:46

## 2021-01-06 RX ADMIN — PROPOFOL 70 MCG/KG/MIN: 10 INJECTION, EMULSION INTRAVENOUS at 08:45

## 2021-01-06 RX ADMIN — ONDANSETRON 4 MG: 2 INJECTION INTRAMUSCULAR; INTRAVENOUS at 08:51

## 2021-01-06 NOTE — OP NOTE
OPERATIVE REPORT  PATIENT NAME: Jennifer Cisneros    :  1980  MRN: [id number]  Pt Location: [facility] OR ROOM 03    SURGERY DATE: 2021    Surgeon(s) and Role:     * Christian Sullivan DO - Primary     * Tony Ken PA-C - Assisting    Preop Diagnosis:  De Quervain's tenosynovitis, right [M65 4]    Post-Op Diagnosis Codes:     * De Quervain's tenosynovitis, right [M65 4]    Procedure(s) (LRB):  RELEASE DEQUERVAINS (Right)    Specimen(s):  * No specimens in log *    Estimated Blood Loss:   Minimal    Drains:  * No LDAs found *    Anesthesia Type:   IV Sedation with Anesthesia    Operative Indications:  De Quervain's tenosynovitis, right [M65 4]      Operative Findings: Thickened ext retinaculum  All sub compartments released  No subluxation of 1st DC tendons post release    Complications:   None    Procedure and Technique:  Patient is a 65-year-old female presented the office with pain over the radial aspect of the right wrist   I have been seeing her for some time  We did try bracing as well as injections  This did not provide adequate relief  Surgical versus nonsurgical options were discussed  Patient elected to undergo 1st dorsal compartment release on the right wrist in the operating room  Risks benefits alternatives were explained  Patient understood these  All questions were answered  Consent forms were obtained  Day of surgery patient identified by 1st last name  The right upper extremity was marked  Patient was transferred to the preop area to the OR underwent sedation without complication  Time-out was performed successfully  A brain with agreement  I reviewed the consent form myself  Antibiotics had been given  Using a 50 50 mix of 0 5% plain Marcaine and 1% plain lidocaine a superficial radial nerve block was performed as well as the infiltration over the incision would be  Patient tolerated this well  This was done under sterile technique    Well-padded tourniquet was then placed on the arm  Patient went sterile prep and drape  The limb was exsanguinated Esmarch bandage and tourniquet was elevated 250 mmHg  This point attention was turned to the radial aspect of the right wrist   A 1 5 cm incision was made over the radial aspect of the right wrist   Careful dissection was performed through skin subcutaneous tissue  Any branches of superficial radial nerves that were identified retracted with a dull Heiss retractor  We then encountered the extensor retinaculum over the 1st dorsal compartment  We will to visualize this in its entire length  This was released over the dorsal aspect  Also compartments were released over the APL and EPB  After we fully released the sub compartments over the EPL and EPB tenosynovitis to was performed around these tendons  The wrist was taken through range of motion this point there is no subluxation of the tendons  After was completely released tourniquet was released  All bleeding was stopped bipolar cautery direct pressure  The wound was thoroughly irrigated normal saline solution  The wound was then closed with 4 0 Vicryl 5 0 running Monocryl  No Steri-Strips were used as patient was allergic to adhesive  Patient is placed in a soft sterile dressing  Patient tolerated the procedure well  She has woken from anesthesia and transferred the OR PACU stable condition     I was present for the entire procedure, A qualified resident physician was not available and A physician assistant was required during the procedure for retraction tissue handling,dissection and suturing    Patient Disposition:  PACU  and hemodynamically stable    SIGNATURE: Claire Rivero DO  DATE: January 6, 2021  TIME: 9:48 AM

## 2021-01-06 NOTE — H&P
56-year-old female with right wrist de quervain's  She underwent a steroid injection at her last visit which provided her with good relief  Continued nonoperative versus surgical intervention was discussed  Patient is planning on having a second child soon and will be breast feeding again  Patient elected to proceed with surgical intervention in the form of right de quervain's release  Risks of the surgery are inclusive of but not limited to bleeding, infection, nerve injury, blood clot, worsening of symptoms, not achieving the anticipated results, persistent stiffness, weakness and the need for additional surgery  The patient verbally stated they understood those risks and would like to proceed with the surgery  Surgical consent was signed in the office today  I will see her the day of surgery  She will require COVID screening prior tos surgery          Subjective:   Jennifer Cisneros is a 47 y o  female who presents to the office today for follow up evaluation right de quervain's  Patient underwent a steroid injection at her last visit at the end of September which she states provided her with good relief  She notes intermittent pain to the radial aspect of her wrist  Patient states she did notice some skin discoloration following the steroid injection          Review of Systems   Constitutional: Negative for chills and fever  HENT: Negative for drooling and sneezing  Eyes: Negative for redness  Respiratory: Negative for cough and wheezing  Gastrointestinal: Negative for nausea and vomiting  Musculoskeletal: Negative for arthralgias, joint swelling and myalgias  Neurological: Negative for weakness and numbness  Psychiatric/Behavioral: Negative for behavioral problems  The patient is not nervous/anxious              Medical History   History reviewed   No pertinent past medical history         Surgical History         Past Surgical History:   Procedure Laterality Date    REDUCTION MAMMAPLASTY        RHINOPLASTY                     Family History   Problem Relation Age of Onset    Stroke Maternal Grandmother           Social History           Occupational History    Not on file   Tobacco Use    Smoking status: Never Smoker    Smokeless tobacco: Never Used   Substance and Sexual Activity    Alcohol use: Yes    Drug use: Never    Sexual activity: Not on file            Current Outpatient Medications:     Ascorbic Acid (VITAMIN C) 1000 MG tablet, Take 1,000 mg by mouth daily, Disp: , Rfl:     BIOTIN PO, Take by mouth, Disp: , Rfl:     calcium carbonate (OS-SEAN) 600 MG tablet, Take 600 mg by mouth 2 (two) times a day with meals, Disp: , Rfl:     Docosahexaenoic Acid (PRENATAL DHA PO), Take 1 tablet by mouth daily, Disp: , Rfl:     Ergocalciferol (VITAMIN D2) 2000 units TABS, Take by mouth, Disp: , Rfl:     levothyroxine 25 mcg tablet, , Disp: , Rfl:     promethazine-codeine (PHENERGAN WITH CODEINE) 6 25-10 mg/5 mL syrup, Take 5 mL by mouth 3 (three) times a day as needed for cough (nausea) (Patient not taking: Reported on 6/23/2020), Disp: 120 mL, Rfl: 0     No Known Allergies     Objective:      Vitals:     12/07/20 1739   BP: 115/77   Pulse: 83         Ortho Exam      Right wrist     Mild TTP 1st dorsal compartment   Full ROM  Compartments soft  Brisk capillary refill  S/m intact median, radial, and ulnar nerve      Physical Exam  Constitutional:       Appearance: She is well-developed  HENT:      Head: Normocephalic and atraumatic  Eyes:      General:         Right eye: No discharge  Left eye: No discharge  Conjunctiva/sclera: Conjunctivae normal    Neck:      Musculoskeletal: Normal range of motion and neck supple  Cardiovascular:      Rate and Rhythm: Normal rate  Pulmonary:      Effort: Pulmonary effort is normal  No respiratory distress  Musculoskeletal:      Comments: As noted in HPI   Skin:     General: Skin is warm and dry     Neurological:      Mental Status: She is alert and oriented to person, place, and time  Psychiatric:         Behavior: Behavior normal          Thought Content:  Thought content normal          Judgment: Judgment normal

## 2021-01-06 NOTE — ANESTHESIA POSTPROCEDURE EVALUATION
Post-Op Assessment Note    CV Status:  Stable  Pain Score: 0    Pain management: adequate     Mental Status:  Awake   Hydration Status:  Stable   PONV Controlled:  None   Airway Patency:  Patent      Post Op Vitals Reviewed: Yes      Staff: CRNA   Comments: spontaneously breathing, HOB @ 30 degrees, vss, simple mask to O2, fully endorsed to recovery w/o AC        No complications documented      /58 (01/06/21 0930)    Temp (!) 97 2 °F (36 2 °C) (01/06/21 0930)    Pulse 59 (01/06/21 0930)   Resp 16 (01/06/21 0930)    SpO2   99

## 2021-01-06 NOTE — ANESTHESIA PREPROCEDURE EVALUATION
Procedure:  RELEASE DEQUERVAINS (Right Hand)    Relevant Problems   ANESTHESIA (within normal limits)      CARDIO (within normal limits)      ENDO   (+) Acquired hypothyroidism      PULMONARY (within normal limits)        Physical Exam    Airway    Mallampati score: I  TM Distance: >3 FB  Neck ROM: full     Dental   No notable dental hx     Cardiovascular  Rhythm: regular, Rate: normal,     Pulmonary  Breath sounds clear to auscultation,     Other Findings        Anesthesia Plan  ASA Score- 2     Anesthesia Type- IV sedation with anesthesia with ASA Monitors  Additional Monitors:   Airway Plan:           Plan Factors-Exercise tolerance (METS): >4 METS  Chart reviewed  Existing labs reviewed  Patient summary reviewed  Patient is not a current smoker  Induction- intravenous  Postoperative Plan-     Informed Consent- Anesthetic plan and risks discussed with patient  I personally reviewed this patient with the CRNA  Discussed and agreed on the Anesthesia Plan with the CRNA  Iman Ritter

## 2021-01-06 NOTE — INTERVAL H&P NOTE
H&P reviewed  After examining the patient I find no changes in the patients condition since the H&P had been written      Vitals:    01/06/21 0749   BP: 122/59   Pulse: 81   Resp: 20   Temp: 97 6 °F (36 4 °C)   SpO2: 98%

## 2021-01-06 NOTE — DISCHARGE INSTRUCTIONS
Dr Gissel Amaya have had surgery on your arm today, please read and follow the information below:  · Elevate your hand above your elbow during the next 24-48 hours to help with swelling  · Place your hand and arm over your head with motion at your shoulder three times a day  · Do not apply any cream/ointment/oil to your incisions including antibiotics  · Do not soak your hands in standing water (dishwater, tubs, Jacuzzi's, pools, etc ) until given permission (typically 2-3 weeks after injury)    Call the office if you notice any:  · Increased numbness or tingling of your hand or fingers that is not relieved with elevation  · Increasing pain that is not controlled with medication  · Difficulty chewing, breathing, swallowing  · Chest pains or shortness of breath  · Fever over 101 4 degrees  Bandage: Do NOT remove bandage until follow-up appointment  Motion: Move fingers into a fist 5 times a day, DO NOT move any splinted fingers  Weight bearing status: Avoid heavy lifting (>5 pounds) with the extremity that was operated on until follow up appointment  Normal activities of daily living are OK  Ice: Ice for 10 minutes every hour as needed for swelling x 24 hours  Sling: No sling necessary  Pain medication:   Norco/Hydrocodone one tab every 6 hours AS NEEDED for pain     Follow-up Appointment: 10-14 days  Please call the office at [phone] if you have any questions or concerns regarding your post-operative care

## 2021-01-06 NOTE — PERIOPERATIVE NURSING NOTE
Pt d/c to home at this time w/ Altagracia Nur  Via: Walking  Pt left with all belongings  Iv was D/C intact with dry sterile dressing  Encouraged to keep follow up appointments, Verbalized understanding  D/C instructions reviewed and explained  Verbalized understanding  New Rx given and explained  Verbalized understanding

## 2021-01-19 ENCOUNTER — OFFICE VISIT (OUTPATIENT)
Dept: OBGYN CLINIC | Facility: CLINIC | Age: 41
End: 2021-01-19

## 2021-01-19 VITALS
SYSTOLIC BLOOD PRESSURE: 101 MMHG | WEIGHT: 208 LBS | BODY MASS INDEX: 34.66 KG/M2 | HEART RATE: 94 BPM | HEIGHT: 65 IN | DIASTOLIC BLOOD PRESSURE: 72 MMHG

## 2021-01-19 DIAGNOSIS — Z98.890 S/P MUSCULOSKELETAL SYSTEM SURGERY: Primary | ICD-10-CM

## 2021-01-19 PROCEDURE — 99024 POSTOP FOLLOW-UP VISIT: CPT | Performed by: ORTHOPAEDIC SURGERY

## 2021-01-19 PROCEDURE — 3008F BODY MASS INDEX DOCD: CPT | Performed by: ORTHOPAEDIC SURGERY

## 2021-01-19 NOTE — PROGRESS NOTES
Assessment/Plan:  1  S/P musculoskeletal system surgery         Scribe Attestation    I,:  Maryanne Jones MA am acting as a scribe while in the presence of the attending physician :       I,:  Guy King DO personally performed the services described in this documentation    as scribed in my presence  :             68-year-old female 2 weeks status post right de quervain's release  Patient is doing well postoperatively  Her incision is well healed  There is no erythema or signs of infection  She may shower and get the incision wet  She has no restrictions at this time  She may follow up with me as needed  Subjective:   Marshall Casanova is a 47 y o  female who presents to the office today for follow up evaluation 2 weeks s/p right de quervain's release performed on 1/6/21  Patient states she is doing well  She denies any issues with the incision  Review of Systems   Constitutional: Negative for chills and fever  HENT: Negative for drooling and sneezing  Eyes: Negative for redness  Respiratory: Negative for cough and wheezing  Gastrointestinal: Negative for nausea and vomiting  Musculoskeletal: Negative for arthralgias, joint swelling and myalgias  Neurological: Negative for weakness and numbness  Psychiatric/Behavioral: Negative for behavioral problems  The patient is not nervous/anxious            Past Medical History:   Diagnosis Date    Migraines        Past Surgical History:   Procedure Laterality Date    FOOT SURGERY      x2    LASIK      [place] INCIS TENDON SHEATH,RADIAL STYLOID Right 1/6/2021    Procedure: [employer];  Surgeon: Guy King DO;  Location: [facility];  Service: Orthopedics    REDUCTION MAMMAPLASTY      x2    RHINOPLASTY         Family History   Problem Relation Age of Onset    Stroke Maternal Grandmother        Social History     Occupational History    Not on file   Tobacco Use    Smoking status: Never Smoker    Smokeless tobacco: Never Used Substance and Sexual Activity    Alcohol use: Yes    Drug use: Never    Sexual activity: Not on file       No current outpatient medications on file  Allergies   Allergen Reactions    Other      Adhesive tape       Objective:  Vitals:    01/19/21 0937   BP: 101/72   Pulse: 94       Ortho Exam     Right wrist    Incision well healed  No erythema or signs of infection  Full fist   Compartments soft  Brisk capillary refill  S/m intact median, radial, and ulnar nerve     Physical Exam  Constitutional:       Appearance: She is well-developed  HENT:      Head: Normocephalic and atraumatic  Eyes:      General:         Right eye: No discharge  Left eye: No discharge  Conjunctiva/sclera: Conjunctivae normal    Neck:      Musculoskeletal: Normal range of motion and neck supple  Cardiovascular:      Rate and Rhythm: Normal rate  Pulmonary:      Effort: Pulmonary effort is normal  No respiratory distress  Musculoskeletal:      Comments: As noted in HPI   Skin:     General: Skin is warm and dry  Neurological:      Mental Status: She is alert and oriented to person, place, and time  Psychiatric:         Behavior: Behavior normal          Thought Content:  Thought content normal          Judgment: Judgment normal

## 2021-02-11 ENCOUNTER — TELEPHONE (OUTPATIENT)
Dept: OBGYN CLINIC | Facility: HOSPITAL | Age: 41
End: 2021-02-11

## 2021-02-11 NOTE — TELEPHONE ENCOUNTER
Patient has been advised to send a photo through [facility] for further assessment  She stated the incision is not warm to touch but irritated looking  She stated it was like this once first unwrapped but has gotten worse  Will look out for photos   Thanks

## 2021-02-11 NOTE — TELEPHONE ENCOUNTER
Dr Jennifer Veliz responded to patient via [employer] message  Called patient to check and see that she saw that message but unable to leave message on VM  Mailbox full

## 2021-02-11 NOTE — TELEPHONE ENCOUNTER
Patient had Cuate Feather release on 1/6/21 &  is calling stating that her incision area is closed but very red around the scar & looks irritated      Patient was offered an appt but she would like to know if this is normal      [phone]

## 2021-02-12 NOTE — TELEPHONE ENCOUNTER
Spoke to patient  She has been advised of message from Dr Corey and will keep an eye on things  Encouraged call back if anything else arises  Verbalized understanding

## 2021-03-26 ENCOUNTER — OFFICE VISIT (OUTPATIENT)
Dept: OBGYN CLINIC | Facility: CLINIC | Age: 41
End: 2021-03-26
Payer: COMMERCIAL

## 2021-03-26 VITALS
BODY MASS INDEX: 34.66 KG/M2 | HEIGHT: 65 IN | DIASTOLIC BLOOD PRESSURE: 76 MMHG | WEIGHT: 208 LBS | HEART RATE: 86 BPM | SYSTOLIC BLOOD PRESSURE: 109 MMHG

## 2021-03-26 DIAGNOSIS — M18.11 ARTHRITIS OF CARPOMETACARPAL (CMC) JOINT OF RIGHT THUMB: Primary | ICD-10-CM

## 2021-03-26 DIAGNOSIS — Z98.890 S/P MUSCULOSKELETAL SYSTEM SURGERY: ICD-10-CM

## 2021-03-26 PROCEDURE — 99024 POSTOP FOLLOW-UP VISIT: CPT | Performed by: ORTHOPAEDIC SURGERY

## 2021-03-26 PROCEDURE — 20600 DRAIN/INJ JOINT/BURSA W/O US: CPT | Performed by: ORTHOPAEDIC SURGERY

## 2021-03-26 RX ORDER — LIDOCAINE HYDROCHLORIDE 5 MG/ML
0.5 INJECTION, SOLUTION INFILTRATION; PERINEURAL
Status: COMPLETED | OUTPATIENT
Start: 2021-03-26 | End: 2021-03-26

## 2021-03-26 RX ORDER — TRIAMCINOLONE ACETONIDE 40 MG/ML
20 INJECTION, SUSPENSION INTRA-ARTICULAR; INTRAMUSCULAR
Status: COMPLETED | OUTPATIENT
Start: 2021-03-26 | End: 2021-03-26

## 2021-03-26 RX ADMIN — TRIAMCINOLONE ACETONIDE 20 MG: 40 INJECTION, SUSPENSION INTRA-ARTICULAR; INTRAMUSCULAR at 09:07

## 2021-03-26 RX ADMIN — LIDOCAINE HYDROCHLORIDE 0.5 ML: 5 INJECTION, SOLUTION INFILTRATION; PERINEURAL at 09:07

## 2021-03-26 NOTE — PROGRESS NOTES
Assessment/Plan:  1  Arthritis of carpometacarpal (CMC) joint of right thumb  Small joint arthrocentesis   2  S/P musculoskeletal system surgery  Ambulatory referral to PT/OT hand therapy       Scribe Attestation    I,:  Maryanne Jones MA am acting as a scribe while in the presence of the attending physician :       I,:  Gely Arcos DO personally performed the services described in this documentation    as scribed in my presence  :             71-year-old female 11 weeks s/p right wrist de quervain's release  Patient is mildly tender over the scar  She is more tender over the thumb CMC joint  Treatment options were discussed in the form of a right thumb CMC injection  She was agreeable to this  She consented and underwent a right thumb CMC injection in the office today without any complications  A referral was also provided to OT for scar massage  She will follow up in 3 months for repeat evaluation  Subjective:   Ina Dumont is a 47 y o  female who presents to the office today for evaluation 11 weeks s/p right de quervain's release performed on 1/6/21  Patient notes pain over the scar  She states she did hit this area a few times and saw scars  Patient states she has been performing scar massage  She also notes pain with lifting and   Review of Systems   Constitutional: Negative for chills and fever  HENT: Negative for drooling and sneezing  Eyes: Negative for redness  Respiratory: Negative for cough and wheezing  Gastrointestinal: Negative for nausea and vomiting  Musculoskeletal: Negative for arthralgias, joint swelling and myalgias  Neurological: Negative for weakness and numbness  Psychiatric/Behavioral: Negative for behavioral problems  The patient is not nervous/anxious            Past Medical History:   Diagnosis Date    Migraines        Past Surgical History:   Procedure Laterality Date    FOOT SURGERY      x2    LASIK      [place] INCIS TENDON SHEATH,RADIAL STYLOID Right 1/6/2021    Procedure: [employer];  Surgeon: Pam Gandara DO;  Location: [facility];  Service: Orthopedics    REDUCTION MAMMAPLASTY      x2    RHINOPLASTY         Family History   Problem Relation Age of Onset    Stroke Maternal Grandmother        Social History     Occupational History    Not on file   Tobacco Use    Smoking status: Never Smoker    Smokeless tobacco: Never Used   Substance and Sexual Activity    Alcohol use: Yes    Drug use: Never    Sexual activity: Not on file       No current outpatient medications on file  Allergies   Allergen Reactions    Other      Adhesive tape       Objective:  Vitals:    03/26/21 0845   BP: 109/76   Pulse: 86       Ortho Exam     Right wrist    Scar well healed  Tender over the scar  - finkelstein   TTP thumb CMC  Compartments soft  Brisk capillary refill  S/m intact median, radial, and ulnar nerve     Physical Exam  Constitutional:       Appearance: She is well-developed  HENT:      Head: Normocephalic and atraumatic  Eyes:      General:         Right eye: No discharge  Left eye: No discharge  Conjunctiva/sclera: Conjunctivae normal    Neck:      Musculoskeletal: Normal range of motion and neck supple  Cardiovascular:      Rate and Rhythm: Normal rate  Pulmonary:      Effort: Pulmonary effort is normal  No respiratory distress  Musculoskeletal:      Comments: As noted in HPI   Skin:     General: Skin is warm and dry  Neurological:      Mental Status: She is alert and oriented to person, place, and time  Psychiatric:         Behavior: Behavior normal          Thought Content: Thought content normal          Judgment: Judgment normal      Small joint arthrocentesis  Universal Protocol:  Consent: Verbal consent obtained    Consent given by: patient  Patient identity confirmed: verbally with patient    Supporting Documentation  Indications: pain   Procedure Details  Preparation: Patient was prepped and draped in the usual sterile fashion  Needle size: 27 G  Ultrasound guidance: no  Medications administered: 0 5 mL lidocaine 0 5 %; 20 mg triamcinolone acetonide 40 mg/mL    Patient tolerance: patient tolerated the procedure well with no immediate complications  Dressing:  Sterile dressing applied

## 2021-07-02 ENCOUNTER — OFFICE VISIT (OUTPATIENT)
Dept: OBGYN CLINIC | Facility: CLINIC | Age: 41
End: 2021-07-02
Payer: COMMERCIAL

## 2021-07-02 VITALS
DIASTOLIC BLOOD PRESSURE: 72 MMHG | HEIGHT: 65 IN | SYSTOLIC BLOOD PRESSURE: 102 MMHG | WEIGHT: 204 LBS | HEART RATE: 90 BPM | BODY MASS INDEX: 33.99 KG/M2

## 2021-07-02 DIAGNOSIS — M18.11 ARTHRITIS OF CARPOMETACARPAL (CMC) JOINT OF RIGHT THUMB: Primary | ICD-10-CM

## 2021-07-02 DIAGNOSIS — Z98.890 STATUS POST DE QUERVAIN'S RELEASE SURGERY: ICD-10-CM

## 2021-07-02 PROCEDURE — 99213 OFFICE O/P EST LOW 20 MIN: CPT | Performed by: ORTHOPAEDIC SURGERY

## 2021-07-02 NOTE — PROGRESS NOTES
ASSESSMENT/PLAN:      47 y o  female with right thumb CMC arthritis, aprox  6 months s/p right De Quervain's release, DOS 1/6/21  It was discussed with Kaitlin Mcneil that her pain is coming from the thumb [facility] joint  She was advised to continue the use of the comfort cool brace on an as needed basis  She may benefit from using Voltaren Gel up to 4x a day on an as needed basis  A repeat right thumb CMC CSI was offered today, she declined at this time  A MRI may be ordered in the future if pain worsens or fails to improve as the thumb CMC CSI did not provide her with pain relief  Advised to ice the area  Follow up in the office as needed if symptoms worsen or fail to improve  The patient verbalized understanding of exam findings and treatment plan  We engaged in the shared decision-making process and treatment options were discussed at length with the patient  Surgical and conservative management discussed today along with risks and benefits  Diagnoses and all orders for this visit:    Arthritis of carpometacarpal [place]) joint of right thumb    Status post de Quervain's release surgery      Follow Up:  Return if symptoms worsen or fail to improve  To Do Next Visit:  Re-evaluation of current issue    ____________________________________________________________________________________________________________________________________________      CHIEF COMPLAINT:  Chief Complaint   Patient presents with    Right Thumb - Pain, Follow-up       SUBJECTIVE:  Joseph Villaseñor is a 47y o  year old RHD female who presents to the office today for a follow up regarding right thumb CMC arthritis  She is aprox  6 months s/p right De Quervain's release  She underwent a CMC CSI aprox  3 months ago  She states that the CSI was not beneficial for her  She notes pain to the base of her thumb when lifting, pinching or gripping  Kaitlin Mcneil states that the pain is more of a twinge at times   She is not currently taking anything for pain control  Pain is currently 0/10  She did not attend OT for scar massage  Shari Quiles has a comfort cool brace that she usually does not use  I have personally reviewed all the relevant PMH, PSH, SH, FH, Medications and allergies  PAST MEDICAL HISTORY:  Past Medical History:   Diagnosis Date    Migraines        PAST SURGICAL HISTORY:  Past Surgical History:   Procedure Laterality Date    FOOT SURGERY      x2    LASIK      MA INCIS TENDON SHEATH,RADIAL STYLOID Right 1/6/2021    Procedure: Vinayak Riding;  Surgeon: Robin Ladd DO;  Location: [facility];  Service: Orthopedics    REDUCTION MAMMAPLASTY      x2    RHINOPLASTY         FAMILY HISTORY:  Family History   Problem Relation Age of Onset    Stroke Maternal Grandmother        SOCIAL HISTORY:  Social History     Tobacco Use    Smoking status: Never Smoker    Smokeless tobacco: Never Used   Vaping Use    Vaping Use: Never used   Substance Use Topics    Alcohol use: Yes    Drug use: Never       MEDICATIONS:  No current outpatient medications on file  ALLERGIES:  Allergies   Allergen Reactions    Other      Adhesive tape       REVIEW OF SYSTEMS:  Review of Systems   Constitutional: Negative for chills, fever and unexpected weight change  HENT: Negative for hearing loss, nosebleeds and sore throat  Eyes: Negative for pain, redness and visual disturbance  Respiratory: Negative for cough, shortness of breath and wheezing  Cardiovascular: Negative for chest pain, palpitations and leg swelling  Gastrointestinal: Negative for abdominal pain, nausea and vomiting  Endocrine: Negative for polydipsia and polyuria  Genitourinary: Negative for difficulty urinating and hematuria  Musculoskeletal: Positive for arthralgias and myalgias  Negative for joint swelling  Skin: Negative for rash and wound  Neurological: Negative for dizziness, numbness and headaches     Psychiatric/Behavioral: Negative for decreased concentration, dysphoric mood and suicidal ideas  The patient is not nervous/anxious  VITALS:  Vitals:    07/02/21 0857   BP: 102/72   Pulse: 90       LABS:  HgA1c: No results found for: HGBA1C  BMP:   Lab Results   Component Value Date    CALCIUM 9 0 07/12/2018    K 4 2 07/12/2018    CO2 27 07/12/2018     07/12/2018    BUN 17 07/12/2018    CREATININE 0 88 07/12/2018       _____________________________________________________  PHYSICAL EXAMINATION:  General: well developed and well nourished, alert, oriented times 3 and appears comfortable  Psychiatric: Normal  HEENT: Normocephalic, Atraumatic Trachea Midline, No torticollis  Pulmonary: No audible wheezing or respiratory distress   Cardiovascular: No pitting edema, 2+ radial pulse   Abdominal/GI: abdomen non tender, non distended   Skin: No masses, erythema, lacerations, fluctation, ulcerations  Neurovascular: Sensation Intact to the Median, Ulnar, Radial Nerve, Motor Intact to the Median, Ulnar, Radial Nerve and Pulses Intact  Musculoskeletal: Normal, except as noted in detailed exam and in HPI        MUSCULOSKELETAL EXAMINATION:    Right hand/wrist:     No erythema, ecchymosis or edema  Well healed surgical incision   Tender to palpation thumb CMC joint   More tender over thumb CMC then wrist   + thumb CMC grind   Full wrist ROM   Full composite fist     ___________________________________________________  STUDIES REVIEWED:  No new imaging to review           PROCEDURES PERFORMED:  Procedures  No Procedures performed today    _____________________________________________________      Christian Mckeon    I,:  Joanne Jones am acting as a scribe while in the presence of the attending physician :       I,:  Betty Bailey DO personally performed the services described in this documentation    as scribed in my presence :

## 2021-12-02 ENCOUNTER — TELEPHONE (OUTPATIENT)
Dept: OBGYN CLINIC | Facility: HOSPITAL | Age: 41
End: 2021-12-02

## 2021-12-03 ENCOUNTER — OFFICE VISIT (OUTPATIENT)
Dept: OBGYN CLINIC | Facility: CLINIC | Age: 41
End: 2021-12-03
Payer: COMMERCIAL

## 2021-12-03 VITALS
SYSTOLIC BLOOD PRESSURE: 93 MMHG | WEIGHT: 207 LBS | DIASTOLIC BLOOD PRESSURE: 63 MMHG | BODY MASS INDEX: 34.49 KG/M2 | HEART RATE: 92 BPM | HEIGHT: 65 IN

## 2021-12-03 DIAGNOSIS — M70.52 PES ANSERINUS BURSITIS OF LEFT KNEE: Primary | ICD-10-CM

## 2021-12-03 DIAGNOSIS — M25.562 LEFT KNEE PAIN, UNSPECIFIED CHRONICITY: ICD-10-CM

## 2021-12-03 PROCEDURE — 20610 DRAIN/INJ JOINT/BURSA W/O US: CPT | Performed by: ORTHOPAEDIC SURGERY

## 2021-12-03 PROCEDURE — 99214 OFFICE O/P EST MOD 30 MIN: CPT | Performed by: ORTHOPAEDIC SURGERY

## 2021-12-03 RX ORDER — LIDOCAINE HYDROCHLORIDE 10 MG/ML
1 INJECTION, SOLUTION INFILTRATION; PERINEURAL
Status: COMPLETED | OUTPATIENT
Start: 2021-12-03 | End: 2021-12-03

## 2021-12-03 RX ORDER — DEXAMETHASONE SODIUM PHOSPHATE 100 MG/10ML
40 INJECTION INTRAMUSCULAR; INTRAVENOUS
Status: COMPLETED | OUTPATIENT
Start: 2021-12-03 | End: 2021-12-03

## 2021-12-03 RX ADMIN — LIDOCAINE HYDROCHLORIDE 1 ML: 10 INJECTION, SOLUTION INFILTRATION; PERINEURAL at 10:42

## 2021-12-03 RX ADMIN — DEXAMETHASONE SODIUM PHOSPHATE 40 MG: 100 INJECTION INTRAMUSCULAR; INTRAVENOUS at 10:42

## 2022-05-04 ENCOUNTER — EVALUATION (OUTPATIENT)
Dept: PHYSICAL THERAPY | Facility: CLINIC | Age: 42
End: 2022-05-04
Payer: COMMERCIAL

## 2022-05-04 DIAGNOSIS — M62.89 PELVIC FLOOR DYSFUNCTION: Primary | ICD-10-CM

## 2022-05-04 DIAGNOSIS — N39.3 STRESS INCONTINENCE: ICD-10-CM

## 2022-05-04 DIAGNOSIS — M62.08 DIASTASIS RECTI: ICD-10-CM

## 2022-05-04 PROCEDURE — 97140 MANUAL THERAPY 1/> REGIONS: CPT

## 2022-05-04 PROCEDURE — 97110 THERAPEUTIC EXERCISES: CPT

## 2022-05-04 PROCEDURE — 97112 NEUROMUSCULAR REEDUCATION: CPT

## 2022-05-04 PROCEDURE — 97162 PT EVAL MOD COMPLEX 30 MIN: CPT

## 2022-05-04 NOTE — LETTER
May 10, 2022    Brooke Cowan [address]    Patient: Earlean Osler   YOB: 1980   Date of Visit: 2022     Encounter Diagnosis     ICD-10-CM    1  Pelvic floor dysfunction  M62 89    2  Stress incontinence  N39 3    3  Diastasis recti  M62 08        Dear Dr Cooper Fuentes: Thank you for your recent referral of Earlean Osler  Please review the attached evaluation summary from Rashmi's recent visit  Please verify that you agree with the plan of care by signing the attached order  If you have any questions or concerns, please do not hesitate to call  I sincerely appreciate the opportunity to share in the care of one of your patients and hope to have another opportunity to work with you in the near future  Sincerely,    Keven Elliott, PT      Referring Provider:      I certify that I have read the below Plan of Care and certify the need for these services furnished under this plan of treatment while under my care  Brooke Cowan CNM  [address] [place] [address] [phone]  Via Fax: [phone]          PT Evaluation     Today's date: 2022  Patient name: Earlean Osler  : 1980  MRN: [id number]  Referring provider: Avani Coley CNM  Dx:   Encounter Diagnosis     ICD-10-CM    1  Pelvic floor dysfunction  M62 89    2  Stress incontinence  N39 3    3  Diastasis recti  M62 08                   Assessment  Assessment details:   CASE SUMMARY:   Earlean Osler is a 46y o  year old female who reports onset of symptoms ~ 6 months   Patient describes symptoms as: bothersome, painful and, would like preventative measures  Symptoms are : intermittent/constant  Martin [place] is limited in the following activities: lifting, functional activities, and recreational activities  PMHx includes: See chart for full details with medications       Patient's clinical presentation is consistent with their referring diagnosis of: Pelvic floor dysfunction  (primary encounter diagnosis)    Stress incontinence    Diastasis recti    POC was discussed and agreed upon with patient  Patient was educated on: pelvic floor dysfunction, pelvic floor muscle anatomy, pelvic floor muscle function, prognosis and diagnosis, HEP and POC  Patient vocalized a good understanding of  POC and HEP issued  Patient would benefit from skilled physical therapy services to address their aforementioned functional limitations and progress towards prior level of function and independence with home exercise program       Pelvic floor verbal consent and written consent signed and in chart  Patient deferred second person in room: YES        Impairments: abnormal muscle firing, abnormal muscle tone, activity intolerance, impaired physical strength, lacks appropriate home exercise program, poor posture  and poor body mechanics    Symptom irritability: highUnderstanding of Dx/Px/POC: good   Prognosis: good    Goals  STG:  -The patient will improve pelvic floor muscle strength 1 to 2 grade in 8 to 12 weeks  -The patient will improve pelvic floor muscle endurance to 2 to 3 seconds in 8 to 12 weeks  -The patient will reduce diastasis of the rectus abdominis by 1/2 to 1 finger widths in 8 to 12 weeks  -The patient will reduce number of pads to 3 per day  LT  The patient will be independent with HEP upon discharge  2  The patient will perform higher level activities and exercise without leaking upon discharge  Plan  Plan details: HEP development, stretching, strengthening, A/AA/PROM, joint mobilizations, posture education, STM/MI as needed to reduce muscle tension, muscle reeducation, patient has been educated in Dx, prognosis and plan of care and is in agreement     Patient would benefit from: PT eval, women's health eval and skilled physical therapy  Planned modality interventions: biofeedback, cryotherapy, ultrasound, TENS and hydrotherapy  Planned therapy interventions: abdominal trunk stabilization, activity modification, manual therapy, massage, motor coordination training, neuromuscular re-education, body mechanics training, patient education, postural training, self care, strengthening, therapeutic exercise, therapeutic activities, home exercise program, breathing training and flexibility  Frequency: 1x week  Duration in weeks: 8        PT Pelvic Floor Subjective:   History of Present Illness:   Pt reports that she is 34 weeks pregnant and has been having leakage when she is coughing, sneezing, laughing, and with light exercises  Pt reports that she is using about three pads a day  States that this all began when she became sick  Pt reports that she never had this issue prior to pregnancy  States that she has noted increase pressure in her pelvic floor when she has a bowel movement and when emptying her bladder  States that pressure is not present when doing functional activities and when participating in recreational activities  States that she is having some back pain and hip pain when laying on her right side   Quality of life: fair    Social Support:     Lives in:  Multiple-level home    Lives with:  Significant other and young children    Relationship status: /committed    Work status: employed full time    Life stress level: 3  Hand dominance:  Right  Diet and Exercise:    Diet:balanced nutrition    Exercise frequency: never    Not exercising due to: lack of time  OB/ gyn History    Gestational History:     Prior Pregnancy: Yes      Number of prior pregnancies: 1    Number of term pregnancies: 1    Delivery Type: vaginal delivery    Bladder Function:     Voiding Difficulties positive for: urgency and frequent urination      Voiding Difficulties comments:     Voiding frequency: every 15-30 minutes    Urinary leakage: urine leakage    Urinary leakage aggravated by: coughing, sneezing, exercise, standing up and walking to the bathroom    Nocturia (episodes per night): 3    Painful urination: No      Fluid Intake Type: Water  Incontinence Management:     Pads/Diaper Use:  Day  Bowel Function:     Bowel frequency: every 2 days    Stool softener use: no stool softeners    Enema use: no enema    Uses "squatty potty": no Squatty Potty  Sexual Function:     Sexually Active:  Not sexually active and non-contributory    Pain during intercourse: No    Pain:     Current pain ratin    At best pain ratin    At worst pain ratin    Quality:  Dull ache, pressure, sharp and radiating    Aggravating factors: Bowel movements, exercise, sit to stand transition, prolonged positions and walking  Treatments:     Previous treatment:  Physical therapy    Current treatment: physical therapy        Objective   Pelvic Floor Exam   Position: supine exam    Diastatis   Diastasis recti present: yes  tenderness at linea alba  unable to engage transverse abdominis     General Perineum Exam:   perineum intact  Visual Inspection of Perineum:   Excursion of perineal body in cephalad direction with contraction of pelvic floor muscles (PFM): weak  Excursion of perineal body in caudal direction with relaxation of pelvic floor muscles (PFM): weak  Cotton swab test: non-tender  Cough reflex: cough reflex  Sphincter Tone Resting: weak  Sphincter Tone Squeeze: weak  Sensation: intact    Pelvic Organ Prolapse   Position: hook-lying  At rest: mild and mild  With bearing down: mild (>1cm from hymenal remnants)    Pelvic Floor Muscle Exam     Palpation   Minimal increased muscle tension in the bulbospongiosus and ischiocavernosus  Moderate increased muscle tension in the super transverse perineal, puborectalis, pubococcygeus, iliococcygeus and coccygeus    Pelvic floor muscle relaxation is delayed and incomplete       PERFECT Score   Power right: 1+/5  Power left: 1+/5        Flowsheet Rows      Most Recent Value   PT/OT G-Codes    Current Score 17   Projected Score 0 Precautions: standard      Manuals 5/4/22            PFM assessment  written and verbal consent provided             Perineal massage  IM                                       Neuro Re-Ed             TA activation supine, sitting, and standing  Educated on and reviewed for appropriate muscle activation             Diaphragmatic breathing  educated and reviewed            Diastasis Recti education on appropriate protection of muscles                                                                 Ther Ex                                                                                                                     Ther Activity                                       Gait Training                                       Modalities

## 2022-05-04 NOTE — PROGRESS NOTES
PT Evaluation     Today's date: 2022  Patient name: Kelsi Jordan  : 1980  MRN: [id number]  Referring provider: Desmond Saint, CNM  Dx:   Encounter Diagnosis     ICD-10-CM    1  Pelvic floor dysfunction  M62 89    2  Stress incontinence  N39 3    3  Diastasis recti  M62 08                   Assessment  Assessment details:   CASE SUMMARY:   Kelsi Jordan is a 46y o  year old female who reports onset of symptoms ~ 6 months   Patient describes symptoms as: bothersome, painful and, would like preventative measures  Symptoms are : intermittent/constant  [place] is limited in the following activities: lifting, functional activities, and recreational activities  PMHx includes: See chart for full details with medications  Patient's clinical presentation is consistent with their referring diagnosis of: Pelvic floor dysfunction  (primary encounter diagnosis)    Stress incontinence    Diastasis recti    POC was discussed and agreed upon with patient  Patient was educated on: pelvic floor dysfunction, pelvic floor muscle anatomy, pelvic floor muscle function, prognosis and diagnosis, HEP and POC  Patient vocalized a good understanding of  POC and HEP issued  Patient would benefit from skilled physical therapy services to address their aforementioned functional limitations and progress towards prior level of function and independence with home exercise program       Pelvic floor verbal consent and written consent signed and in chart  Patient deferred second person in room: YES        Impairments: abnormal muscle firing, abnormal muscle tone, activity intolerance, impaired physical strength, lacks appropriate home exercise program, poor posture  and poor body mechanics    Symptom irritability: highUnderstanding of Dx/Px/POC: good   Prognosis: good    Goals  STG:  -The patient will improve pelvic floor muscle strength 1 to 2 grade in 8 to 12 weeks     -The patient will improve pelvic floor muscle endurance to 2 to 3 seconds in 8 to 12 weeks  -The patient will reduce diastasis of the rectus abdominis by 1/2 to 1 finger widths in 8 to 12 weeks  -The patient will reduce number of pads to 3 per day  LT  The patient will be independent with HEP upon discharge  2  The patient will perform higher level activities and exercise without leaking upon discharge  Plan  Plan details: HEP development, stretching, strengthening, A/AA/PROM, joint mobilizations, posture education, STM/MI as needed to reduce muscle tension, muscle reeducation, patient has been educated in Dx, prognosis and plan of care and is in agreement  Patient would benefit from: PT eval, women's health eval and skilled physical therapy  Planned modality interventions: biofeedback, cryotherapy, ultrasound, TENS and hydrotherapy  Planned therapy interventions: abdominal trunk stabilization, activity modification, manual therapy, massage, motor coordination training, neuromuscular re-education, body mechanics training, patient education, postural training, self care, strengthening, therapeutic exercise, therapeutic activities, home exercise program, breathing training and flexibility  Frequency: 1x week  Duration in weeks: 8        PT Pelvic Floor Subjective:   History of Present Illness:   Pt reports that she is 34 weeks pregnant and has been having leakage when she is coughing, sneezing, laughing, and with light exercises  Pt reports that she is using about three pads a day  States that this all began when she became sick  Pt reports that she never had this issue prior to pregnancy  States that she has noted increase pressure in her pelvic floor when she has a bowel movement and when emptying her bladder  States that pressure is not present when doing functional activities and when participating in recreational activities  States that she is having some back pain and hip pain when laying on her right side   Quality of life: fair    Social Support:     Lives in:  Multiple-level home    Lives with:  Significant other and young children    Relationship status: /committed    Work status: employed full time    Life stress level: 3  Hand dominance:  Right  Diet and Exercise:    Diet:balanced nutrition    Exercise frequency: never    Not exercising due to: lack of time  OB/ gyn History    Gestational History:     Prior Pregnancy: Yes      Number of prior pregnancies: 1    Number of term pregnancies: 1    Delivery Type: vaginal delivery    Bladder Function:     Voiding Difficulties positive for: urgency and frequent urination      Voiding Difficulties comments:     Voiding frequency: every 15-30 minutes    Urinary leakage: urine leakage    Urinary leakage aggravated by: coughing, sneezing, exercise, standing up and walking to the bathroom    Nocturia (episodes per night): 3    Painful urination: No      Fluid Intake Type: Water  Incontinence Management:     Pads/Diaper Use:  Day  Bowel Function:     Bowel frequency: every 2 days    Stool softener use: no stool softeners    Enema use: no enema    Uses "squatty potty": no Squatty Potty  Sexual Function:     Sexually Active:  Not sexually active and non-contributory    Pain during intercourse: No    Pain:     Current pain ratin    At best pain ratin    At worst pain ratin    Quality:  Dull ache, pressure, sharp and radiating    Aggravating factors: Bowel movements, exercise, sit to stand transition, prolonged positions and walking  Treatments:     Previous treatment:  Physical therapy    Current treatment: physical therapy        Objective   Pelvic Floor Exam   Position: supine exam    Diastatis   Diastasis recti present: yes  tenderness at linea alba  unable to engage transverse abdominis     General Perineum Exam:   perineum intact       Visual Inspection of Perineum:   Excursion of perineal body in cephalad direction with contraction of pelvic floor muscles (PFM): weak  Excursion of perineal body in caudal direction with relaxation of pelvic floor muscles (PFM): weak  Cotton swab test: non-tender  Cough reflex: cough reflex  Sphincter Tone Resting: weak  Sphincter Tone Squeeze: weak  Sensation: intact    Pelvic Organ Prolapse   Position: hook-lying  At rest: mild and mild  With bearing down: mild (>1cm from hymenal remnants)    Pelvic Floor Muscle Exam     Palpation   Minimal increased muscle tension in the bulbospongiosus and ischiocavernosus  Moderate increased muscle tension in the super transverse perineal, puborectalis, pubococcygeus, iliococcygeus and coccygeus    Pelvic floor muscle relaxation is delayed and incomplete       PERFECT Score   Power right: 1+/5  Power left: 1+/5        Flowsheet Rows      Most Recent Value   PT/OT G-Codes    Current Score 17   Projected Score 0             Precautions: standard      Manuals 5/4/22            PFM assessment  written and verbal consent provided             Perineal massage  IM                                       Neuro Re-Ed             TA activation supine, sitting, and standing  Educated on and reviewed for appropriate muscle activation             Diaphragmatic breathing  educated and reviewed            Diastasis Recti education on appropriate protection of muscles                                                                 Ther Ex                                                                                                                     Ther Activity                                       Gait Training                                       Modalities

## 2022-05-09 ENCOUNTER — OFFICE VISIT (OUTPATIENT)
Dept: PHYSICAL THERAPY | Facility: CLINIC | Age: 42
End: 2022-05-09
Payer: COMMERCIAL

## 2022-05-09 DIAGNOSIS — M62.89 PELVIC FLOOR DYSFUNCTION: Primary | ICD-10-CM

## 2022-05-09 DIAGNOSIS — M62.08 DIASTASIS RECTI: ICD-10-CM

## 2022-05-09 DIAGNOSIS — N39.3 STRESS INCONTINENCE: ICD-10-CM

## 2022-05-09 PROCEDURE — 97112 NEUROMUSCULAR REEDUCATION: CPT

## 2022-05-09 PROCEDURE — 97140 MANUAL THERAPY 1/> REGIONS: CPT

## 2022-05-09 PROCEDURE — 97110 THERAPEUTIC EXERCISES: CPT

## 2022-05-09 NOTE — PROGRESS NOTES
Daily Note     Today's date: 2022  Patient name: Allen Wilson  : 1980  MRN: [id number]  Referring provider: Valentino Coop, CNM  Dx:   Encounter Diagnosis     ICD-10-CM    1  Pelvic floor dysfunction  M62 89    2  Stress incontinence  N39 3    3  Diastasis recti  M62 08                   Subjective: Pt reports that she over she has mild discomfort this afternoon  States that pain level is 5/10  Objective: See treatment diary below      Assessment: Tolerated treatment well  Patient demonstrated fatigue post treatment, exhibited good technique with therapeutic exercises and would benefit from continued PT      Plan: Continue per plan of care        Precautions: standard      Manuals 22           PFM assessment  written and verbal consent provided             Perineal massage  IM  IM                                      Neuro Re-Ed             TA activation supine, sitting, and standing  Educated on and reviewed for appropriate muscle activation  Educated on and reviewed for appropriate muscle activation            Diaphragmatic breathing  educated and reviewed education and reviewed            Diastasis Recti education on appropriate protection of muscles  education on appropriate protection of muscles                                                                Ther Ex                                                                                                                     Ther Activity                                       Gait Training                                       Modalities

## 2022-05-16 ENCOUNTER — OFFICE VISIT (OUTPATIENT)
Dept: PHYSICAL THERAPY | Facility: CLINIC | Age: 42
End: 2022-05-16
Payer: COMMERCIAL

## 2022-05-16 DIAGNOSIS — N39.3 STRESS INCONTINENCE: ICD-10-CM

## 2022-05-16 DIAGNOSIS — M62.08 DIASTASIS RECTI: ICD-10-CM

## 2022-05-16 DIAGNOSIS — M62.89 PELVIC FLOOR DYSFUNCTION: Primary | ICD-10-CM

## 2022-05-16 PROCEDURE — 97110 THERAPEUTIC EXERCISES: CPT

## 2022-05-16 PROCEDURE — 97112 NEUROMUSCULAR REEDUCATION: CPT

## 2022-05-16 NOTE — PROGRESS NOTES
Daily Note     Today's date: 2022  Patient name: Param Guy  : 1980  MRN: [id number]  Referring provider: Nirav Schofield CNM  Dx:   Encounter Diagnosis     ICD-10-CM    1  Pelvic floor dysfunction  M62 89    2  Stress incontinence  N39 3    3  Diastasis recti  M62 08                   Subjective: Pt reports that she has noted slight increase pain in her low back  She also states that she feels "like she dropped"  Pain level 3/10  Objective: See treatment diary below      Assessment: Tolerated treatment well  Patient demonstrated fatigue post treatment, exhibited good technique with therapeutic exercises and would benefit from continued PT      Plan: Continue per plan of care  Precautions: standard      Manuals 22          PFM assessment  written and verbal consent provided             Perineal massage  IM  IM  IM                                     Neuro Re-Ed             TA activation supine, sitting, and standing  Educated on and reviewed for appropriate muscle activation  Educated on and reviewed for appropriate muscle activation  TA iso, PFM iso x 20           Diaphragmatic breathing  educated and reviewed education and reviewed  2-3 min           Diastasis Recti education on appropriate protection of muscles  education on appropriate protection of muscles  5s x 20 progression of TA muscles                                                                 Ther Ex                                                                                                                     Ther Activity                                       Gait Training                                       Modalities

## 2022-05-23 ENCOUNTER — OFFICE VISIT (OUTPATIENT)
Dept: PHYSICAL THERAPY | Facility: CLINIC | Age: 42
End: 2022-05-23
Payer: COMMERCIAL

## 2022-05-23 DIAGNOSIS — M62.89 PELVIC FLOOR DYSFUNCTION: Primary | ICD-10-CM

## 2022-05-23 DIAGNOSIS — N39.3 STRESS INCONTINENCE: ICD-10-CM

## 2022-05-23 DIAGNOSIS — M62.08 DIASTASIS RECTI: ICD-10-CM

## 2022-05-23 PROCEDURE — 97112 NEUROMUSCULAR REEDUCATION: CPT

## 2022-05-23 PROCEDURE — 97140 MANUAL THERAPY 1/> REGIONS: CPT

## 2022-05-23 PROCEDURE — 97110 THERAPEUTIC EXERCISES: CPT

## 2022-05-23 NOTE — PROGRESS NOTES
Daily Note   Update 6/15/22: Pt will be discharged to an [facility] at this time  Today's date: 2022  Patient name: Vianney Dailey  : 1980  MRN: [id number]  Referring provider: Dennis David CNM  Dx:   Encounter Diagnosis     ICD-10-CM    1  Pelvic floor dysfunction  M62 89    2  Stress incontinence  N39 3    3  Diastasis recti  M62 08                   Subjective: Pt reports that she is approaching due date and at this time she would like to put PT on hold and resume postpartum  Objective: See treatment diary below      Assessment: Tolerated treatment well  Patient demonstrated fatigue post treatment, exhibited good technique with therapeutic exercises and would benefit from continued PT      Plan: Continue per plan of care  Precautions: standard      Manuals 22         PFM assessment  written and verbal consent provided             Perineal massage  IM  IM  IM  IM                                    Neuro Re-Ed             TA activation supine, sitting, and standing  Educated on and reviewed for appropriate muscle activation  Educated on and reviewed for appropriate muscle activation  TA iso, PFM iso x 20  TA iso, OFM iso x 20          Diaphragmatic breathing  educated and reviewed education and reviewed  2-3 min  2-3 min          Diastasis Recti education on appropriate protection of muscles  education on appropriate protection of muscles  5s x 20 progression of TA muscles    5s x 20 progression of TA muscles                                                              Ther Ex                 Gypsy stretch x 20              Child pose x 15              Cat camel x 15                                                                           Ther Activity                                       Gait Training                                       Modalities

## 2024-02-21 PROBLEM — R05.9 COUGH: Status: RESOLVED | Noted: 2019-03-18 | Resolved: 2024-02-21

## 2024-02-21 PROBLEM — J11.1 INFLUENZA: Status: RESOLVED | Noted: 2019-02-13 | Resolved: 2024-02-21

## 2025-03-14 ENCOUNTER — APPOINTMENT (OUTPATIENT)
Dept: RADIOLOGY | Facility: CLINIC | Age: 45
End: 2025-03-14
Payer: COMMERCIAL

## 2025-03-14 ENCOUNTER — OFFICE VISIT (OUTPATIENT)
Dept: OBGYN CLINIC | Facility: CLINIC | Age: 45
End: 2025-03-14
Payer: COMMERCIAL

## 2025-03-14 VITALS — HEIGHT: 65 IN | WEIGHT: 214.6 LBS | BODY MASS INDEX: 35.75 KG/M2

## 2025-03-14 DIAGNOSIS — M25.562 PAIN IN BOTH KNEES, UNSPECIFIED CHRONICITY: ICD-10-CM

## 2025-03-14 DIAGNOSIS — M22.2X1 PATELLOFEMORAL PAIN SYNDROME OF BOTH KNEES: Primary | ICD-10-CM

## 2025-03-14 DIAGNOSIS — M25.561 PAIN IN BOTH KNEES, UNSPECIFIED CHRONICITY: ICD-10-CM

## 2025-03-14 DIAGNOSIS — M22.2X2 PATELLOFEMORAL PAIN SYNDROME OF BOTH KNEES: Primary | ICD-10-CM

## 2025-03-14 PROCEDURE — 99203 OFFICE O/P NEW LOW 30 MIN: CPT | Performed by: ORTHOPAEDIC SURGERY

## 2025-03-14 PROCEDURE — 73564 X-RAY EXAM KNEE 4 OR MORE: CPT

## 2025-03-14 PROCEDURE — 20610 DRAIN/INJ JOINT/BURSA W/O US: CPT | Performed by: ORTHOPAEDIC SURGERY

## 2025-03-14 RX ADMIN — TRIAMCINOLONE ACETONIDE 40 MG: 40 INJECTION, SUSPENSION INTRA-ARTICULAR; INTRAMUSCULAR at 10:30

## 2025-03-14 RX ADMIN — ROPIVACAINE HYDROCHLORIDE 4 ML: 2 INJECTION, SOLUTION EPIDURAL; INFILTRATION; PERINEURAL at 10:30

## 2025-03-14 RX ADMIN — TRIAMCINOLONE ACETONIDE 4 ML: 40 INJECTION, SUSPENSION INTRA-ARTICULAR; INTRAMUSCULAR at 10:30

## 2025-03-14 NOTE — PROGRESS NOTES
Patient Name: Rashmi Abel      : 1980       MRN: [id number]   Encounter Provider: Magdy Duke MD   Encounter Date: 25  Encounter department: [facility]         Assessment & Plan  Patellofemoral pain syndrome of both knees  Patient's pain is flared significantly since her pregnancy and subsequent delivery.  She has minimal arthritic changes.  I feel she would benefit greatly from physical therapy with quadriceps hip and core strengthening as well as stretching.  She does have a large barrier to attending physical therapy at this time due to the high level of care that her  requires frequent doctor visits.  I do feel that she has a reasonable chance at improvement in symptoms with injections that were provided in both knees today.  Ultimately I think her best long-term solution is improved strength and function through exercises.  But I am hoping this will least get her through with some relief while she continues to care for her new child.  We did discuss that weight loss will also be helpful in the future which she understands well.  She is going to work toward that goal in the future as well as time allows.  She is going to follow-up as needed the future.  Orders:    Large joint arthrocentesis: L knee    Ambulatory Referral to Physical Therapy; Future    Large joint arthrocentesis: R knee           _____________________________________________________  CHIEF COMPLAINT:  Chief Complaint   Patient presents with    Left Knee - Pain     No recent trauma or injury.    Right Knee - Pain         SUBJECTIVE:  Rashmi Abel is a 44 y.o. female who presents bilateral knee pain which started after she had her baby daughter this January.  She noticed while she is in her recliner chair and squatting and kneeling she is experiencing pain in both knees.  She has a history of being physically active in the past through college sports and would wear knee  "bracing for patellar tracking issues.  She has received viscosupplementation in 2010 as well as cortisone shot injection in 2021 with relief.  Currently she notices going downstairs, kneeling and going from squatting to standing will cause her pain.  She does not notice any instability of her knees or swelling.  She currently takes Advil as needed for pain.         Surgical History:  none    PAST MEDICAL HISTORY:  Past Medical History:   Diagnosis Date    Migraines        PAST SURGICAL HISTORY:  Past Surgical History:   Procedure Laterality Date    FOOT SURGERY      x2    LASIK      ID INCISION EXTENSOR TENDON SHEATH WRIST Right 1/6/2021    Procedure: RELEASE DEQUERVAINS;  Surgeon: Ike Corey DO;  Location: [facility];  Service: Orthopedics    REDUCTION MAMMAPLASTY      x2    RHINOPLASTY         FAMILY HISTORY:  Family History   Problem Relation Age of Onset    Stroke Maternal Grandmother        SOCIAL HISTORY:  Social History     Tobacco Use    Smoking status: Never    Smokeless tobacco: Never   Vaping Use    Vaping status: Never Used   Substance Use Topics    Alcohol use: Yes    Drug use: Never       MEDICATIONS:  No current outpatient medications on file.    ALLERGIES:  Allergies   Allergen Reactions    Other      Adhesive tape       LABS:  HgA1c: No results found for: \"HGBA1C\"  BMP:   Lab Results   Component Value Date    CALCIUM 9.0 07/12/2018    K 4.2 07/12/2018    CO2 27 07/12/2018     07/12/2018    BUN 17 07/12/2018    CREATININE 0.88 07/12/2018     CBC: No components found for: \"CBC\"    _____________________________________________________  Review of systems: ROS is negative other than that noted in the HPI.  Constitutional: Negative for fatigue and fever.   HENT: Negative for sore throat.    Respiratory: Negative for shortness of breath.    Cardiovascular: Negative for chest pain.   Gastrointestinal: Negative for abdominal pain.   Endocrine: Negative for cold intolerance and heat intolerance. "   Genitourinary: Negative for flank pain.   Musculoskeletal: Negative for back pain.   Skin: Negative for rash.   Allergic/Immunologic: Negative for immunocompromised state.   Neurological: Negative for dizziness.   Psychiatric/Behavioral: Negative for agitation.     Knee Exam: Right   No significant skin lesions or deformity  Range of motion from 0 to 120   Medial joint line tenderness  Tenderness over quadriceps tendon and patellar tendon  Knee is stable to varus stress, valgus stress, Lachman, and posterior drawer.    Patella tracks centrally  without) palpable crepitus  Calf compartments are soft and supple  (-) Ranjeet's sign  2+ DP and PT pulses with brisk capillary refill to the toes  Sural, saphenous, tibial, superficial, and deep peroneal motor and sensory distributions intact  Sensation light touch intact distally        Knee Exam: Left   No significant skin lesions or deformity  Range of motion from 0 to 120   Medial  joint line tenderness   Tenderness to palpation over quadriceps tendon and patellar tendon  Knee is stable to varus stress, valgus stress, Lachman, and posterior drawer.    Patella tracks centrally without) palpable crepitus  Calf compartments are soft and supple  (-) Ranjeet's sign  2+ DP and PT pulses with brisk capillary refill to the toes  Sural, saphenous, tibial, superficial, and deep peroneal motor and sensory distributions intact  Sensation light touch intact distally      Physical exam:  General/Constitutional: NAD, well developed, well nourished  HENT: Normocephalic, atraumatic  CV: Intact distal pulses, regular rate  Resp: No respiratory distress or labored breathing  Abdomen: soft, nondistended   Lymphatic: No lymphadenopathy palpated  Neuro: Alert and Oriented x 3, no focal deficits  Psych: Normal mood, normal affect  Skin: Warm, dry, no rashes, no erythema  _____________________________________________________  STUDIES REVIEWED:  X-rays of the bilateral knees reviewed and  "interpreted today. These show mild arthritic changes of bilateral knees with narrow joint spacing medial aspects.      PROCEDURES PERFORMED:  -Large joint arthrocentesis: L knee  Universal Protocol:  procedure performed by consultantConsent: Verbal consent obtained. Written consent obtained.  Risks and benefits: risks, benefits and alternatives were discussed  Consent given by: patient  Time out: Immediately prior to procedure a \"time out\" was called to verify the correct patient, procedure, equipment, support staff and site/side marked as required.  Timeout called at: 3/14/2025 11:23 AM.  Patient understanding: patient states understanding of the procedure being performed  Patient consent: the patient's understanding of the procedure matches consent given  Site marked: the operative site was marked  Patient identity confirmed: verbally with patient  Supporting Documentation  Indications: pain   Procedure Details  Location: knee - L knee  Needle size: 22 G  Ultrasound guidance: no  Approach: anterolateral  Medications administered: 4 mL triamcinolone acetonide 40 mg/mL; 4 mL ropivacaine 0.2 %    Patient tolerance: patient tolerated the procedure well with no immediate complications  Dressing:  Sterile dressing applied      Large joint arthrocentesis: R knee  [place] Protocol:  procedure performed by consultantConsent: Verbal consent obtained. Written consent obtained.  Risks and benefits: risks, benefits and alternatives were discussed  Consent given by: patient  Time out: Immediately prior to procedure a \"time out\" was called to verify the correct patient, procedure, equipment, support staff and site/side marked as required.  Timeout called at: 3/14/2025 11:27 AM.  Patient understanding: patient states understanding of the procedure being performed  Patient consent: the patient's understanding of the procedure matches consent given  Site marked: the operative site was marked  Patient identity confirmed: verbally " with patient  Supporting Documentation  Indications: pain   Procedure Details  Location: knee - R knee  Needle size: 22 G  Ultrasound guidance: no  Approach: anterolateral  Medications administered: 40 mg triamcinolone acetonide 40 mg/mL; 4 mL ropivacaine 0.2 %    Patient tolerance: patient tolerated the procedure well with no immediate complications  Dressing:  Sterile dressing applied           Scribe Attestation      I,:  Alfredo Cordova am acting as a scribe while in the presence of the attending physician.:       I,:  Magdy Duke MD personally performed the services described in this documentation    as scribed in my presence.:

## 2025-03-18 RX ORDER — TRIAMCINOLONE ACETONIDE 40 MG/ML
4 INJECTION, SUSPENSION INTRA-ARTICULAR; INTRAMUSCULAR
Status: COMPLETED | OUTPATIENT
Start: 2025-03-14 | End: 2025-03-14

## 2025-03-18 RX ORDER — TRIAMCINOLONE ACETONIDE 40 MG/ML
40 INJECTION, SUSPENSION INTRA-ARTICULAR; INTRAMUSCULAR
Status: COMPLETED | OUTPATIENT
Start: 2025-03-14 | End: 2025-03-14

## 2025-03-18 RX ORDER — ROPIVACAINE HYDROCHLORIDE 2 MG/ML
4 INJECTION, SOLUTION EPIDURAL; INFILTRATION; PERINEURAL
Status: COMPLETED | OUTPATIENT
Start: 2025-03-14 | End: 2025-03-14

## 2025-05-28 ENCOUNTER — OFFICE VISIT (OUTPATIENT)
Age: 45
End: 2025-05-28
Payer: COMMERCIAL

## 2025-05-28 VITALS — WEIGHT: 214 LBS | BODY MASS INDEX: 35.65 KG/M2 | HEIGHT: 65 IN

## 2025-05-28 DIAGNOSIS — M70.52 PES ANSERINUS BURSITIS OF LEFT KNEE: Primary | ICD-10-CM

## 2025-05-28 PROCEDURE — 20610 DRAIN/INJ JOINT/BURSA W/O US: CPT | Performed by: PHYSICIAN ASSISTANT

## 2025-05-28 PROCEDURE — 99214 OFFICE O/P EST MOD 30 MIN: CPT | Performed by: PHYSICIAN ASSISTANT

## 2025-05-28 RX ORDER — LIDOCAINE HYDROCHLORIDE 10 MG/ML
1 INJECTION, SOLUTION INFILTRATION; PERINEURAL
Status: COMPLETED | OUTPATIENT
Start: 2025-05-28 | End: 2025-05-28

## 2025-05-28 RX ORDER — BETAMETHASONE SODIUM PHOSPHATE AND BETAMETHASONE ACETATE 3; 3 MG/ML; MG/ML
6 INJECTION, SUSPENSION INTRA-ARTICULAR; INTRALESIONAL; INTRAMUSCULAR; SOFT TISSUE
Status: COMPLETED | OUTPATIENT
Start: 2025-05-28 | End: 2025-05-28

## 2025-05-28 RX ADMIN — LIDOCAINE HYDROCHLORIDE 1 ML: 10 INJECTION, SOLUTION INFILTRATION; PERINEURAL at 13:00

## 2025-05-28 RX ADMIN — BETAMETHASONE SODIUM PHOSPHATE AND BETAMETHASONE ACETATE 6 MG: 3; 3 INJECTION, SUSPENSION INTRA-ARTICULAR; INTRALESIONAL; INTRAMUSCULAR; SOFT TISSUE at 13:00

## 2025-05-28 NOTE — PROGRESS NOTES
Assessment:  Assessment & Plan  Pes anserinus bursitis of left knee       Patient was offered and accepted a left knee Pes anserine bursa corticosteroid injection.  Activities as tolerated with modification avoid pain.  Ice and anti-inflammatories as needed.  Patient may proceed with physical therapy as her schedule allows.  Patient is very busy caring for her  child.  Follow-up in 6 weeks with Dr. Duke.           To do next visit:  Return for 6 weeks with Dr. Duke([place]).    The above stated was discussed in layman's terms and the patient expressed understanding.  All questions were answered to the patient's satisfaction.         Subjective:   Rashmi Abel is a 44 y.o. female who presents to the office today for evaluation of her left knee.  Patient notes an onset of left knee pain approximately 1 to 2 weeks ago.  She was previously seen by Dr. Duke on 3/14/2025 and was provided with bilateral knee intra-articular corticosteroid injections for patellofemoral inflammation.  She notes an onset of inferior medial left knee pain recently.  She denies any injury or trauma.  She was diagnosed with anserine bursitis when she was experiencing similar pain in .  She states her pain can reach 8 out of 10 in intensity with increased activity and prolonged standing and walking.  No weakness or instability.  No numbness or tingling.  No fevers or chills.  She has been resting and utilizing ice and taking over-the-counter anti-inflammatories with mild improvement.      Review of systems negative unless otherwise specified in HPI      Past Medical History[1]    Past Surgical History[2]    Family History[3]    Social History     Occupational History    Not on file   Tobacco Use    Smoking status: Never    Smokeless tobacco: Never   Vaping Use    Vaping status: Never Used   Substance and Sexual Activity    Alcohol use: Yes    Drug use: Never    Sexual activity: Not on file       Current  "Medications[4]    Allergies[5]       There were no vitals filed for this visit.    Objective:  Gen: No acute distress, resting comfortably in bed  HEENT: Eyes clear, moist mucus membranes, hearing intact  Respiratory: No audible wheezing or stridor  Cardiovascular: Well Perfused peripherally, 2+ distal pulse  Abdomen: nondistended, no peritoneal signs                     Left knee: No gross deformity. Skin intact without erythema ecchymosis or swelling.  No knee effusion.  There is tenderness over the Pes anserine bursa.  No joint line tenderness.  Full knee range of motion without pain.  Stable to varus and valgus stress without pain.  Stable Lachman test.  Negative posterior drawer test.  Negative Jonas's test.  Extensor mechanism is intact.  Sensation is intact distally.    Diagnostics, reviewed and taken today if performed as documented:  I personally reviewed the pertinent films in PACS and interpretation is as follows:    X-rays left knee 3/14/2025: No acute osseous abnormality.  No fracture or dislocation.  No significant degenerative changes.    Large joint arthrocentesis: L pes anserine bursa    Performed by: Hector Lacy PA-C  Authorized by: Hector Lacy PA-C    Is this a [place] injection? NoProcedure Details  Location: knee - L pes anserine bursa  Needle size: 22 G  Ultrasound guidance: no  Approach: medial  Medications administered: 1 mL lidocaine 1 %; 6 mg betamethasone acetate-betamethasone sodium phosphate 6 (3-3) mg/mL    Patient tolerance: patient tolerated the procedure well with no immediate complications  Dressing:  Sterile dressing applied            Portions of the record may have been created with voice recognition software.  Occasional wrong word or \"sound a like\" substitutions may have occurred due to the inherent limitations of voice recognition software.  Read the chart carefully and recognize, using context, where substitutions have occurred.         [1]   Past Medical " History:  Diagnosis Date    Migraines    [2]   Past Surgical History:  Procedure Laterality Date    FOOT SURGERY      x2    LASIK      MI INCISION EXTENSOR TENDON SHEATH WRIST Right 1/6/2021    Procedure: RELEASE DEQUERVAINS;  Surgeon: Ike Corey DO;  Location: [facility];  Service: Orthopedics    REDUCTION MAMMAPLASTY      x2    RHINOPLASTY     [3]   Family History  Problem Relation Name Age of Onset    Stroke Maternal Grandmother     [4] No current outpatient medications on file.  [5]   Allergies  Allergen Reactions    Other      Adhesive tape

## (undated) DEVICE — ARM SLING: Brand: DEROYAL

## (undated) DEVICE — INTENDED FOR TISSUE SEPARATION, AND OTHER PROCEDURES THAT REQUIRE A SHARP SURGICAL BLADE TO PUNCTURE OR CUT.: Brand: BARD-PARKER SAFETY BLADES SIZE 15, STERILE

## (undated) DEVICE — DISPOSABLE EQUIPMENT COVER: Brand: SMALL TOWEL DRAPE

## (undated) DEVICE — DRAPE SHEET THREE QUARTER

## (undated) DEVICE — COBAN 4 IN STERILE

## (undated) DEVICE — PACK GENERAL LF

## (undated) DEVICE — SPONGE GAUZE 4 X 8 12 PLY STRL LF

## (undated) DEVICE — ACE WRAP 3 IN UNSTERILE

## (undated) DEVICE — CURITY NON-ADHERENT STRIPS: Brand: CURITY

## (undated) DEVICE — TIBURON HAND DRAPE: Brand: CONVERTORS

## (undated) DEVICE — PADDING CAST 3IN COTTON STRL

## (undated) DEVICE — COBAN 2 IN UNSTERILE

## (undated) DEVICE — SUT MONOCRYL 5-0 P-3 18 IN Y493G

## (undated) DEVICE — GLOVE INDICATOR PI UNDERGLOVE SZ 7.5 BLUE

## (undated) DEVICE — CUFF TOURNIQUET 18 X 4 IN QUICK CONNECT DISP 1 BLADDER

## (undated) DEVICE — STOCKINETTE REGULAR

## (undated) DEVICE — SATINCRESCENT® KNIFE STRAIGHT: Brand: SATINCRESCENT®

## (undated) DEVICE — BANDAGE, ESMARK LF STR 4"X9'(20/CS): Brand: CYPRESS

## (undated) DEVICE — ACE WRAP 2 IN UNSTERILE

## (undated) DEVICE — ASTOUND STANDARD SURGICAL GOWN, XL: Brand: CONVERTORS

## (undated) DEVICE — SUT VICRYL 4-0 P-3 18 IN J494G

## (undated) DEVICE — SPONGE SCRUB 4 PCT CHLORHEXIDINE

## (undated) DEVICE — ALUMI-HAND POSITIONER XLG

## (undated) DEVICE — GLOVE SRG BIOGEL 7.5